# Patient Record
Sex: MALE | Race: WHITE | NOT HISPANIC OR LATINO | Employment: UNEMPLOYED | URBAN - METROPOLITAN AREA
[De-identification: names, ages, dates, MRNs, and addresses within clinical notes are randomized per-mention and may not be internally consistent; named-entity substitution may affect disease eponyms.]

---

## 2021-10-29 ENCOUNTER — OFFICE VISIT (OUTPATIENT)
Dept: FAMILY MEDICINE CLINIC | Facility: CLINIC | Age: 41
End: 2021-10-29
Payer: COMMERCIAL

## 2021-10-29 VITALS
HEIGHT: 71 IN | OXYGEN SATURATION: 98 % | WEIGHT: 171.4 LBS | DIASTOLIC BLOOD PRESSURE: 78 MMHG | HEART RATE: 67 BPM | TEMPERATURE: 97.3 F | SYSTOLIC BLOOD PRESSURE: 118 MMHG | BODY MASS INDEX: 24 KG/M2

## 2021-10-29 DIAGNOSIS — Z11.59 NEED FOR HEPATITIS C SCREENING TEST: ICD-10-CM

## 2021-10-29 DIAGNOSIS — F10.10 ALCOHOL USE DISORDER, MILD, ABUSE: ICD-10-CM

## 2021-10-29 DIAGNOSIS — Z00.00 HEALTH MAINTENANCE EXAMINATION: Primary | ICD-10-CM

## 2021-10-29 DIAGNOSIS — A63.0 CONDYLOMA ACUMINATA: ICD-10-CM

## 2021-10-29 DIAGNOSIS — Z23 NEEDS FLU SHOT: ICD-10-CM

## 2021-10-29 DIAGNOSIS — F90.0 ATTENTION DEFICIT HYPERACTIVITY DISORDER (ADHD), PREDOMINANTLY INATTENTIVE TYPE: ICD-10-CM

## 2021-10-29 DIAGNOSIS — Z13.220 NEED FOR LIPID SCREENING: ICD-10-CM

## 2021-10-29 DIAGNOSIS — Z11.4 ENCOUNTER FOR SCREENING FOR HIV: ICD-10-CM

## 2021-10-29 PROCEDURE — 90682 RIV4 VACC RECOMBINANT DNA IM: CPT

## 2021-10-29 PROCEDURE — 90471 IMMUNIZATION ADMIN: CPT

## 2021-10-29 PROCEDURE — 99386 PREV VISIT NEW AGE 40-64: CPT | Performed by: FAMILY MEDICINE

## 2021-10-29 RX ORDER — IMIQUIMOD 12.5 MG/.25G
1 CREAM TOPICAL 3 TIMES WEEKLY
Qty: 24 EACH | Refills: 2 | Status: SHIPPED | OUTPATIENT
Start: 2021-10-29

## 2021-10-29 RX ORDER — DEXTROAMPHETAMINE SACCHARATE, AMPHETAMINE ASPARTATE, DEXTROAMPHETAMINE SULFATE AND AMPHETAMINE SULFATE 2.5; 2.5; 2.5; 2.5 MG/1; MG/1; MG/1; MG/1
10 TABLET ORAL DAILY
Qty: 30 TABLET | Refills: 0 | Status: SHIPPED | OUTPATIENT
Start: 2021-10-29 | End: 2021-11-29 | Stop reason: SDUPTHER

## 2021-11-23 ENCOUNTER — LAB (OUTPATIENT)
Dept: LAB | Facility: CLINIC | Age: 41
End: 2021-11-23
Payer: COMMERCIAL

## 2021-11-23 DIAGNOSIS — Z13.220 NEED FOR LIPID SCREENING: ICD-10-CM

## 2021-11-23 DIAGNOSIS — Z11.59 NEED FOR HEPATITIS C SCREENING TEST: ICD-10-CM

## 2021-11-23 DIAGNOSIS — Z11.4 ENCOUNTER FOR SCREENING FOR HIV: ICD-10-CM

## 2021-11-23 LAB
CHOLEST SERPL-MCNC: 198 MG/DL
HCV AB SER QL: NORMAL
HDLC SERPL-MCNC: 89 MG/DL
LDLC SERPL CALC-MCNC: 98 MG/DL (ref 0–100)
NONHDLC SERPL-MCNC: 109 MG/DL
TRIGL SERPL-MCNC: 57 MG/DL

## 2021-11-23 PROCEDURE — 86803 HEPATITIS C AB TEST: CPT

## 2021-11-23 PROCEDURE — 87389 HIV-1 AG W/HIV-1&-2 AB AG IA: CPT

## 2021-11-23 PROCEDURE — 36415 COLL VENOUS BLD VENIPUNCTURE: CPT

## 2021-11-23 PROCEDURE — 80061 LIPID PANEL: CPT

## 2021-11-24 LAB — HIV 1+2 AB+HIV1 P24 AG SERPL QL IA: NORMAL

## 2021-11-29 ENCOUNTER — OFFICE VISIT (OUTPATIENT)
Dept: FAMILY MEDICINE CLINIC | Facility: CLINIC | Age: 41
End: 2021-11-29
Payer: COMMERCIAL

## 2021-11-29 VITALS
DIASTOLIC BLOOD PRESSURE: 10 MMHG | SYSTOLIC BLOOD PRESSURE: 144 MMHG | TEMPERATURE: 97.4 F | WEIGHT: 169.8 LBS | HEIGHT: 71 IN | HEART RATE: 74 BPM | BODY MASS INDEX: 23.77 KG/M2 | OXYGEN SATURATION: 98 %

## 2021-11-29 DIAGNOSIS — F10.10 ALCOHOL USE DISORDER, MILD, ABUSE: ICD-10-CM

## 2021-11-29 DIAGNOSIS — F90.0 ATTENTION DEFICIT HYPERACTIVITY DISORDER (ADHD), PREDOMINANTLY INATTENTIVE TYPE: Primary | ICD-10-CM

## 2021-11-29 DIAGNOSIS — A63.0 CONDYLOMA ACUMINATA: ICD-10-CM

## 2021-11-29 PROCEDURE — 99214 OFFICE O/P EST MOD 30 MIN: CPT | Performed by: FAMILY MEDICINE

## 2021-11-29 PROCEDURE — 1036F TOBACCO NON-USER: CPT | Performed by: FAMILY MEDICINE

## 2021-11-29 PROCEDURE — 3008F BODY MASS INDEX DOCD: CPT | Performed by: FAMILY MEDICINE

## 2021-11-29 RX ORDER — DEXTROAMPHETAMINE SACCHARATE, AMPHETAMINE ASPARTATE, DEXTROAMPHETAMINE SULFATE AND AMPHETAMINE SULFATE 2.5; 2.5; 2.5; 2.5 MG/1; MG/1; MG/1; MG/1
10 TABLET ORAL DAILY
Qty: 30 TABLET | Refills: 0 | Status: SHIPPED | OUTPATIENT
Start: 2021-11-29 | End: 2021-12-23

## 2021-12-23 DIAGNOSIS — F90.0 ATTENTION DEFICIT HYPERACTIVITY DISORDER (ADHD), PREDOMINANTLY INATTENTIVE TYPE: ICD-10-CM

## 2021-12-23 RX ORDER — DEXTROAMPHETAMINE SACCHARATE, AMPHETAMINE ASPARTATE, DEXTROAMPHETAMINE SULFATE AND AMPHETAMINE SULFATE 2.5; 2.5; 2.5; 2.5 MG/1; MG/1; MG/1; MG/1
TABLET ORAL
Qty: 30 TABLET | Refills: 0 | Status: SHIPPED | OUTPATIENT
Start: 2021-12-23 | End: 2021-12-27

## 2021-12-24 DIAGNOSIS — F90.0 ATTENTION DEFICIT HYPERACTIVITY DISORDER (ADHD), PREDOMINANTLY INATTENTIVE TYPE: ICD-10-CM

## 2021-12-27 RX ORDER — DEXTROAMPHETAMINE SACCHARATE, AMPHETAMINE ASPARTATE, DEXTROAMPHETAMINE SULFATE AND AMPHETAMINE SULFATE 2.5; 2.5; 2.5; 2.5 MG/1; MG/1; MG/1; MG/1
TABLET ORAL
Qty: 30 TABLET | Refills: 0 | Status: SHIPPED | OUTPATIENT
Start: 2021-12-27 | End: 2022-02-01

## 2022-02-01 DIAGNOSIS — F90.0 ATTENTION DEFICIT HYPERACTIVITY DISORDER (ADHD), PREDOMINANTLY INATTENTIVE TYPE: ICD-10-CM

## 2022-02-01 RX ORDER — DEXTROAMPHETAMINE SACCHARATE, AMPHETAMINE ASPARTATE, DEXTROAMPHETAMINE SULFATE AND AMPHETAMINE SULFATE 2.5; 2.5; 2.5; 2.5 MG/1; MG/1; MG/1; MG/1
TABLET ORAL
Qty: 30 TABLET | Refills: 0 | Status: SHIPPED | OUTPATIENT
Start: 2022-02-01 | End: 2022-03-07 | Stop reason: SDUPTHER

## 2022-03-07 ENCOUNTER — OFFICE VISIT (OUTPATIENT)
Dept: FAMILY MEDICINE CLINIC | Facility: CLINIC | Age: 42
End: 2022-03-07
Payer: COMMERCIAL

## 2022-03-07 VITALS
HEART RATE: 82 BPM | DIASTOLIC BLOOD PRESSURE: 88 MMHG | TEMPERATURE: 97.3 F | BODY MASS INDEX: 23.77 KG/M2 | SYSTOLIC BLOOD PRESSURE: 143 MMHG | WEIGHT: 169.8 LBS | HEIGHT: 71 IN | OXYGEN SATURATION: 99 %

## 2022-03-07 DIAGNOSIS — F90.0 ATTENTION DEFICIT HYPERACTIVITY DISORDER (ADHD), PREDOMINANTLY INATTENTIVE TYPE: Primary | ICD-10-CM

## 2022-03-07 DIAGNOSIS — Z23 ENCOUNTER FOR IMMUNIZATION: ICD-10-CM

## 2022-03-07 DIAGNOSIS — F10.10 ALCOHOL USE DISORDER, MILD, ABUSE: ICD-10-CM

## 2022-03-07 DIAGNOSIS — A63.0 CONDYLOMA ACUMINATA: ICD-10-CM

## 2022-03-07 PROCEDURE — 99214 OFFICE O/P EST MOD 30 MIN: CPT | Performed by: FAMILY MEDICINE

## 2022-03-07 PROCEDURE — 1036F TOBACCO NON-USER: CPT | Performed by: FAMILY MEDICINE

## 2022-03-07 PROCEDURE — 90471 IMMUNIZATION ADMIN: CPT

## 2022-03-07 PROCEDURE — 90715 TDAP VACCINE 7 YRS/> IM: CPT

## 2022-03-07 PROCEDURE — 3008F BODY MASS INDEX DOCD: CPT | Performed by: FAMILY MEDICINE

## 2022-03-07 RX ORDER — DEXTROAMPHETAMINE SACCHARATE, AMPHETAMINE ASPARTATE, DEXTROAMPHETAMINE SULFATE AND AMPHETAMINE SULFATE 2.5; 2.5; 2.5; 2.5 MG/1; MG/1; MG/1; MG/1
1 TABLET ORAL DAILY
Qty: 30 TABLET | Refills: 0 | Status: SHIPPED | OUTPATIENT
Start: 2022-03-07 | End: 2022-04-06 | Stop reason: SDUPTHER
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
## 2022-03-07 NOTE — PATIENT INSTRUCTIONS
Doing well with Adderall  Will continue to provide monthly refills although he goes away for an extended period of time, will try to write for a 90 day supply  Alcohol seems to be under controlled  Treatment of condyloma is up to him  Tetanus with whooping cough vaccine    Recheck next October for follow-up an annual physical

## 2022-04-06 DIAGNOSIS — F90.0 ATTENTION DEFICIT HYPERACTIVITY DISORDER (ADHD), PREDOMINANTLY INATTENTIVE TYPE: ICD-10-CM

## 2022-04-07 RX ORDER — DEXTROAMPHETAMINE SACCHARATE, AMPHETAMINE ASPARTATE, DEXTROAMPHETAMINE SULFATE AND AMPHETAMINE SULFATE 2.5; 2.5; 2.5; 2.5 MG/1; MG/1; MG/1; MG/1
1 TABLET ORAL DAILY
Qty: 30 TABLET | Refills: 0 | Status: SHIPPED | OUTPATIENT
Start: 2022-04-07 | End: 2022-05-02 | Stop reason: SDUPTHER

## 2022-05-02 DIAGNOSIS — F90.0 ATTENTION DEFICIT HYPERACTIVITY DISORDER (ADHD), PREDOMINANTLY INATTENTIVE TYPE: ICD-10-CM

## 2022-05-02 RX ORDER — DEXTROAMPHETAMINE SACCHARATE, AMPHETAMINE ASPARTATE, DEXTROAMPHETAMINE SULFATE AND AMPHETAMINE SULFATE 2.5; 2.5; 2.5; 2.5 MG/1; MG/1; MG/1; MG/1
1 TABLET ORAL DAILY
Qty: 30 TABLET | Refills: 0 | Status: SHIPPED | OUTPATIENT
Start: 2022-05-02 | End: 2022-05-31 | Stop reason: SDUPTHER

## 2022-05-31 DIAGNOSIS — F90.0 ATTENTION DEFICIT HYPERACTIVITY DISORDER (ADHD), PREDOMINANTLY INATTENTIVE TYPE: ICD-10-CM

## 2022-05-31 RX ORDER — DEXTROAMPHETAMINE SACCHARATE, AMPHETAMINE ASPARTATE, DEXTROAMPHETAMINE SULFATE AND AMPHETAMINE SULFATE 2.5; 2.5; 2.5; 2.5 MG/1; MG/1; MG/1; MG/1
1 TABLET ORAL DAILY
Qty: 30 TABLET | Refills: 0 | Status: SHIPPED | OUTPATIENT
Start: 2022-05-31 | End: 2022-06-29 | Stop reason: SDUPTHER

## 2022-06-29 DIAGNOSIS — F90.0 ATTENTION DEFICIT HYPERACTIVITY DISORDER (ADHD), PREDOMINANTLY INATTENTIVE TYPE: ICD-10-CM

## 2022-06-30 RX ORDER — DEXTROAMPHETAMINE SACCHARATE, AMPHETAMINE ASPARTATE, DEXTROAMPHETAMINE SULFATE AND AMPHETAMINE SULFATE 2.5; 2.5; 2.5; 2.5 MG/1; MG/1; MG/1; MG/1
1 TABLET ORAL DAILY
Qty: 30 TABLET | Refills: 0 | Status: SHIPPED | OUTPATIENT
Start: 2022-06-30 | End: 2022-07-29 | Stop reason: SDUPTHER

## 2022-07-29 DIAGNOSIS — F90.0 ATTENTION DEFICIT HYPERACTIVITY DISORDER (ADHD), PREDOMINANTLY INATTENTIVE TYPE: ICD-10-CM

## 2022-07-29 RX ORDER — DEXTROAMPHETAMINE SACCHARATE, AMPHETAMINE ASPARTATE, DEXTROAMPHETAMINE SULFATE AND AMPHETAMINE SULFATE 2.5; 2.5; 2.5; 2.5 MG/1; MG/1; MG/1; MG/1
1 TABLET ORAL DAILY
Qty: 30 TABLET | Refills: 0 | Status: SHIPPED | OUTPATIENT
Start: 2022-07-29 | End: 2022-08-28 | Stop reason: SDUPTHER

## 2022-08-28 DIAGNOSIS — F90.0 ATTENTION DEFICIT HYPERACTIVITY DISORDER (ADHD), PREDOMINANTLY INATTENTIVE TYPE: ICD-10-CM

## 2022-08-29 DIAGNOSIS — F90.0 ATTENTION DEFICIT HYPERACTIVITY DISORDER (ADHD), PREDOMINANTLY INATTENTIVE TYPE: ICD-10-CM

## 2022-08-29 RX ORDER — DEXTROAMPHETAMINE SACCHARATE, AMPHETAMINE ASPARTATE, DEXTROAMPHETAMINE SULFATE AND AMPHETAMINE SULFATE 2.5; 2.5; 2.5; 2.5 MG/1; MG/1; MG/1; MG/1
1 TABLET ORAL DAILY
Qty: 30 TABLET | Refills: 0 | Status: SHIPPED | OUTPATIENT
Start: 2022-08-29 | End: 2022-10-27 | Stop reason: SDUPTHER

## 2022-08-29 RX ORDER — DEXTROAMPHETAMINE SACCHARATE, AMPHETAMINE ASPARTATE, DEXTROAMPHETAMINE SULFATE AND AMPHETAMINE SULFATE 2.5; 2.5; 2.5; 2.5 MG/1; MG/1; MG/1; MG/1
1 TABLET ORAL DAILY
Qty: 30 TABLET | Refills: 0 | Status: SHIPPED | OUTPATIENT
Start: 2022-08-29 | End: 2022-08-29 | Stop reason: SDUPTHER

## 2022-10-27 DIAGNOSIS — F90.0 ATTENTION DEFICIT HYPERACTIVITY DISORDER (ADHD), PREDOMINANTLY INATTENTIVE TYPE: ICD-10-CM

## 2022-10-27 RX ORDER — DEXTROAMPHETAMINE SACCHARATE, AMPHETAMINE ASPARTATE, DEXTROAMPHETAMINE SULFATE AND AMPHETAMINE SULFATE 2.5; 2.5; 2.5; 2.5 MG/1; MG/1; MG/1; MG/1
1 TABLET ORAL DAILY
Qty: 30 TABLET | Refills: 0 | Status: SHIPPED | OUTPATIENT
Start: 2022-10-27

## 2022-10-30 ENCOUNTER — TELEPHONE (OUTPATIENT)
Dept: OTHER | Facility: OTHER | Age: 42
End: 2022-10-30

## 2022-12-10 DIAGNOSIS — F90.0 ATTENTION DEFICIT HYPERACTIVITY DISORDER (ADHD), PREDOMINANTLY INATTENTIVE TYPE: ICD-10-CM

## 2022-12-12 RX ORDER — DEXTROAMPHETAMINE SACCHARATE, AMPHETAMINE ASPARTATE, DEXTROAMPHETAMINE SULFATE AND AMPHETAMINE SULFATE 2.5; 2.5; 2.5; 2.5 MG/1; MG/1; MG/1; MG/1
1 TABLET ORAL DAILY
Qty: 30 TABLET | Refills: 0 | Status: SHIPPED | OUTPATIENT
Start: 2022-12-12

## 2023-01-10 DIAGNOSIS — F90.0 ATTENTION DEFICIT HYPERACTIVITY DISORDER (ADHD), PREDOMINANTLY INATTENTIVE TYPE: ICD-10-CM

## 2023-01-11 RX ORDER — DEXTROAMPHETAMINE SACCHARATE, AMPHETAMINE ASPARTATE, DEXTROAMPHETAMINE SULFATE AND AMPHETAMINE SULFATE 2.5; 2.5; 2.5; 2.5 MG/1; MG/1; MG/1; MG/1
1 TABLET ORAL DAILY
Qty: 30 TABLET | Refills: 0 | OUTPATIENT
Start: 2023-01-11

## 2023-03-04 ENCOUNTER — NURSE TRIAGE (OUTPATIENT)
Dept: OTHER | Facility: OTHER | Age: 43
End: 2023-03-04

## 2023-03-04 NOTE — TELEPHONE ENCOUNTER
Reason for Disposition  • Wood tick bite with no complications    Answer Assessment - Initial Assessment Questions  1  TYPE of TICK: "Is it a wood tick or a deer tick?" If unsure, ask: "What size was the tick?" "Did it look more like a watermelon seed or a poppy seed?"       Dog tick per pt  Bigger than a poppy seed    2  LOCATION: "Where is the tick bite located?"       Belly button    3  ONSET: "How long do you think the tick was attached before you removed it?" (Hours or days)       12 hours    4  SYMPTOMS      No symptoms  Protocols used: TICK BITE-ADULT-    Pt  Was bit by a tick last night  States he believes it was a dog tick  Was on his body for about 12 hours  No symptoms at this time  [place] and wondering if he should have prophylactic antibiotics  Explained that dog ticks typically do not carry Lyme Disease and risk is low due to short time of exposure  Encouraged pt  to call the office on Monday if still concerned or sooner with any symptoms  Pt  agreeable with disposition

## 2023-03-04 NOTE — TELEPHONE ENCOUNTER
Regarding: tick bite  ----- Message from Vonda Pugh sent at 3/4/2023  9:24 AM EST -----  "My  had a tick bite and it fed on him  We think it was on him for 12 to 24 hours  I would like him to be put on antibiotics  "

## 2024-04-04 ENCOUNTER — TELEPHONE (OUTPATIENT)
Age: 44
End: 2024-04-04

## 2024-04-04 NOTE — TELEPHONE ENCOUNTER
Appointment scheduled with provider.    Reason: Annual Physical    Symptoms: Physical    Provider: Dr. Maximiliano Ness    Date/Time: Monday 4/29/2024 at 3:40 pm

## 2024-09-16 ENCOUNTER — OFFICE VISIT (OUTPATIENT)
Dept: FAMILY MEDICINE CLINIC | Facility: CLINIC | Age: 44
End: 2024-09-16
Payer: COMMERCIAL

## 2024-09-16 VITALS
DIASTOLIC BLOOD PRESSURE: 82 MMHG | HEART RATE: 79 BPM | HEIGHT: 70 IN | BODY MASS INDEX: 21.93 KG/M2 | SYSTOLIC BLOOD PRESSURE: 134 MMHG | TEMPERATURE: 97.5 F | WEIGHT: 153.2 LBS | OXYGEN SATURATION: 98 %

## 2024-09-16 DIAGNOSIS — F90.0 ATTENTION DEFICIT HYPERACTIVITY DISORDER (ADHD), PREDOMINANTLY INATTENTIVE TYPE: ICD-10-CM

## 2024-09-16 DIAGNOSIS — N52.8 OTHER MALE ERECTILE DYSFUNCTION: ICD-10-CM

## 2024-09-16 DIAGNOSIS — Z00.00 HEALTH MAINTENANCE EXAMINATION: Primary | ICD-10-CM

## 2024-09-16 DIAGNOSIS — F10.91 ALCOHOL USE DISORDER IN REMISSION: ICD-10-CM

## 2024-09-16 DIAGNOSIS — Z12.11 SCREENING FOR COLON CANCER: ICD-10-CM

## 2024-09-16 PROCEDURE — 99396 PREV VISIT EST AGE 40-64: CPT | Performed by: FAMILY MEDICINE

## 2024-09-16 PROCEDURE — 99213 OFFICE O/P EST LOW 20 MIN: CPT | Performed by: FAMILY MEDICINE

## 2024-09-16 RX ORDER — SILDENAFIL 100 MG/1
100 TABLET, FILM COATED ORAL DAILY PRN
Qty: 30 TABLET | Refills: 5 | Status: SHIPPED | OUTPATIENT
Start: 2024-09-16

## 2024-09-16 RX ORDER — DEXTROAMPHETAMINE SACCHARATE, AMPHETAMINE ASPARTATE, DEXTROAMPHETAMINE SULFATE AND AMPHETAMINE SULFATE 2.5; 2.5; 2.5; 2.5 MG/1; MG/1; MG/1; MG/1
1 TABLET ORAL DAILY
Qty: 30 TABLET | Refills: 0 | Status: SHIPPED | OUTPATIENT
Start: 2024-09-16
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
## 2024-10-07 ENCOUNTER — OFFICE VISIT (OUTPATIENT)
Dept: FAMILY MEDICINE CLINIC | Facility: CLINIC | Age: 44
End: 2024-10-07
Payer: COMMERCIAL

## 2024-10-07 VITALS
WEIGHT: 156 LBS | HEIGHT: 70 IN | DIASTOLIC BLOOD PRESSURE: 80 MMHG | SYSTOLIC BLOOD PRESSURE: 144 MMHG | HEART RATE: 106 BPM | BODY MASS INDEX: 22.33 KG/M2 | OXYGEN SATURATION: 96 % | TEMPERATURE: 97.6 F

## 2024-10-07 DIAGNOSIS — F41.9 ANXIETY: Primary | ICD-10-CM

## 2024-10-07 PROCEDURE — 99214 OFFICE O/P EST MOD 30 MIN: CPT | Performed by: FAMILY MEDICINE

## 2024-10-07 RX ORDER — ESCITALOPRAM OXALATE 5 MG/1
5 TABLET ORAL DAILY
Qty: 30 TABLET | Refills: 2 | Status: SHIPPED | OUTPATIENT
Start: 2024-10-07

## 2024-10-07 NOTE — PROGRESS NOTES
"Ambulatory Visit  Name: Polo Nelson      : 1980      MRN: [id number]  Encounter Provider: Racquel Mcallister MD  Encounter Date: 10/7/2024   Encounter department: [facility]    Assessment & Plan  Anxiety  ALTAGRACIA-7 screening performed today with a score of 19 indicative of severe anxiety. Discussed treatment options including starting an SSRI/SNRI and patient is agreeable at this time. Start Lexapro 5mg daily; follow up in 4 weeks. He will continue weekly therapy sessions with his therapist.     Orders:    escitalopram (LEXAPRO) 5 mg tablet; Take 1 tablet (5 mg total) by mouth daily       History of Present Illness     Polo Nelson is a pleasant 43 year old male with a past medical history of ADHD and alcohol abuse who presents today accompanied by his wife Leyla to discuss anxiety. Patient states that he has been suffering from anxiety and panic attacks for the majority of his life but never knew what it was. He states that he started seeing a therapist a month ago which has been helping but thinks that his anxiety has been progressively worsening. He believes that is likely where his alcoholism stems from and admits that he has been using alcohol to self medicate.       Review of Systems   Constitutional: Negative.    HENT: Negative.     Eyes: Negative.    Respiratory: Negative.     Cardiovascular: Negative.    Gastrointestinal: Negative.    Musculoskeletal: Negative.    Skin: Negative.    Neurological: Negative.    Psychiatric/Behavioral:  The patient is nervous/anxious.            Objective     /80 (BP Location: Left arm, Patient Position: Sitting, Cuff Size: Adult)   Pulse (!) 106   Temp 97.6 °F (36.4 °C) (Temporal)   Ht 5' 10\" (1.778 m)   Wt 70.8 kg (156 lb)   SpO2 96%   BMI 22.38 kg/m²     Physical Exam  Constitutional:       General: He is not in acute distress.     Appearance: He is not ill-appearing.   HENT:      Head: Normocephalic and atraumatic.   Eyes:      General:       "   Right eye: No discharge.         Left eye: No discharge.      Extraocular Movements: Extraocular movements intact.   Pulmonary:      Effort: Pulmonary effort is normal. No respiratory distress.   Neurological:      General: No focal deficit present.      Mental Status: He is alert.   Psychiatric:         Mood and Affect: Mood is anxious.

## 2024-10-12 DIAGNOSIS — F90.0 ATTENTION DEFICIT HYPERACTIVITY DISORDER (ADHD), PREDOMINANTLY INATTENTIVE TYPE: ICD-10-CM

## 2024-10-14 RX ORDER — DEXTROAMPHETAMINE SACCHARATE, AMPHETAMINE ASPARTATE, DEXTROAMPHETAMINE SULFATE AND AMPHETAMINE SULFATE 2.5; 2.5; 2.5; 2.5 MG/1; MG/1; MG/1; MG/1
1 TABLET ORAL DAILY
Qty: 30 TABLET | Refills: 0 | Status: SHIPPED | OUTPATIENT
Start: 2024-10-14

## 2024-10-15 ENCOUNTER — TELEPHONE (OUTPATIENT)
Dept: OTHER | Facility: HOSPITAL | Age: 44
End: 2024-10-15

## 2024-10-15 NOTE — TELEPHONE ENCOUNTER
Patient called for a refill of   amphetamine-dextroamphetamine (ADDERALL) 10 mg tablet   Medication was sent to the correct pharmacy yesterday- patient to call pharmacy

## 2024-11-04 ENCOUNTER — TELEPHONE (OUTPATIENT)
Age: 44
End: 2024-11-04

## 2024-11-04 NOTE — TELEPHONE ENCOUNTER
Patient called requesting refill for escitalopram. Patient made aware medication was refilled on 10/07/24 for 30 with 2 refills to [facility] pharmacy. Patient instructed to contact the pharmacy to obtain refills of medication. Patient verbalized understanding.

## 2024-11-18 DIAGNOSIS — F90.0 ATTENTION DEFICIT HYPERACTIVITY DISORDER (ADHD), PREDOMINANTLY INATTENTIVE TYPE: ICD-10-CM

## 2024-11-19 RX ORDER — DEXTROAMPHETAMINE SACCHARATE, AMPHETAMINE ASPARTATE, DEXTROAMPHETAMINE SULFATE AND AMPHETAMINE SULFATE 2.5; 2.5; 2.5; 2.5 MG/1; MG/1; MG/1; MG/1
1 TABLET ORAL DAILY
Qty: 30 TABLET | Refills: 0 | Status: SHIPPED | OUTPATIENT
Start: 2024-11-19

## 2024-11-21 ENCOUNTER — OFFICE VISIT (OUTPATIENT)
Dept: FAMILY MEDICINE CLINIC | Facility: CLINIC | Age: 44
End: 2024-11-21
Payer: COMMERCIAL

## 2024-11-21 VITALS
TEMPERATURE: 97.4 F | HEART RATE: 72 BPM | SYSTOLIC BLOOD PRESSURE: 138 MMHG | OXYGEN SATURATION: 99 % | HEIGHT: 70 IN | BODY MASS INDEX: 23.79 KG/M2 | WEIGHT: 166.2 LBS | DIASTOLIC BLOOD PRESSURE: 100 MMHG

## 2024-11-21 DIAGNOSIS — F41.9 ANXIETY: ICD-10-CM

## 2024-11-21 PROCEDURE — 99213 OFFICE O/P EST LOW 20 MIN: CPT | Performed by: FAMILY MEDICINE

## 2024-11-21 RX ORDER — ESCITALOPRAM OXALATE 10 MG/1
10 TABLET ORAL DAILY
Qty: 90 TABLET | Refills: 0 | Status: SHIPPED | OUTPATIENT
Start: 2024-11-21

## 2024-12-13 ENCOUNTER — OFFICE VISIT (OUTPATIENT)
Dept: FAMILY MEDICINE CLINIC | Facility: CLINIC | Age: 44
End: 2024-12-13
Payer: COMMERCIAL

## 2024-12-13 ENCOUNTER — HOSPITAL ENCOUNTER (OUTPATIENT)
Facility: HOSPITAL | Age: 44
Setting detail: OBSERVATION
Discharge: HOME | End: 2024-12-14
Attending: INTERNAL MEDICINE | Admitting: INTERNAL MEDICINE
Payer: COMMERCIAL

## 2024-12-13 VITALS
HEIGHT: 70 IN | DIASTOLIC BLOOD PRESSURE: 90 MMHG | SYSTOLIC BLOOD PRESSURE: 136 MMHG | WEIGHT: 160.2 LBS | OXYGEN SATURATION: 97 % | HEART RATE: 88 BPM | BODY MASS INDEX: 22.94 KG/M2

## 2024-12-13 DIAGNOSIS — Z72.0 NICOTINE USE: ICD-10-CM

## 2024-12-13 DIAGNOSIS — F10.10 ALCOHOL ABUSE: Primary | ICD-10-CM

## 2024-12-13 DIAGNOSIS — N52.8 OTHER MALE ERECTILE DYSFUNCTION: ICD-10-CM

## 2024-12-13 DIAGNOSIS — F11.90 OPIOID USE: ICD-10-CM

## 2024-12-13 DIAGNOSIS — F41.9 ANXIETY: ICD-10-CM

## 2024-12-13 DIAGNOSIS — F10.20 ALCOHOL USE DISORDER, SEVERE, DEPENDENCE (HCC): Primary | ICD-10-CM

## 2024-12-13 DIAGNOSIS — G47.00 INSOMNIA: ICD-10-CM

## 2024-12-13 DIAGNOSIS — F90.0 ATTENTION DEFICIT HYPERACTIVITY DISORDER (ADHD), PREDOMINANTLY INATTENTIVE TYPE: ICD-10-CM

## 2024-12-13 DIAGNOSIS — F10.939 ALCOHOL WITHDRAWAL SYNDROME WITH COMPLICATION (HCC): ICD-10-CM

## 2024-12-13 PROBLEM — F11.93 OPIOID WITHDRAWAL (HCC): Status: ACTIVE | Noted: 2024-12-13

## 2024-12-13 LAB
ALBUMIN SERPL BCG-MCNC: 4.5 G/DL (ref 3.5–5)
ALP SERPL-CCNC: 54 U/L (ref 34–104)
ALT SERPL W P-5'-P-CCNC: 22 U/L (ref 7–52)
ANION GAP SERPL CALCULATED.3IONS-SCNC: 9 MMOL/L (ref 4–13)
AST SERPL W P-5'-P-CCNC: 38 U/L (ref 13–39)
ATRIAL RATE: 59 BPM
BASOPHILS # BLD AUTO: 0.02 THOUSANDS/ΜL (ref 0–0.1)
BASOPHILS NFR BLD AUTO: 1 % (ref 0–1)
BILIRUB SERPL-MCNC: 0.42 MG/DL (ref 0.2–1)
BUN SERPL-MCNC: 7 MG/DL (ref 5–25)
CALCIUM SERPL-MCNC: 8.7 MG/DL (ref 8.4–10.2)
CHLORIDE SERPL-SCNC: 95 MMOL/L (ref 96–108)
CO2 SERPL-SCNC: 29 MMOL/L (ref 21–32)
CREAT SERPL-MCNC: 0.59 MG/DL (ref 0.6–1.3)
EOSINOPHIL # BLD AUTO: 0.02 THOUSAND/ΜL (ref 0–0.61)
EOSINOPHIL NFR BLD AUTO: 1 % (ref 0–6)
ERYTHROCYTE [DISTWIDTH] IN BLOOD BY AUTOMATED COUNT: 11.6 % (ref 11.6–15.1)
ETHANOL SERPL-MCNC: <10 MG/DL
GFR SERPL CREATININE-BSD FRML MDRD: 121 ML/MIN/1.73SQ M
GLUCOSE SERPL-MCNC: 114 MG/DL (ref 65–140)
HCT VFR BLD AUTO: 40.1 % (ref 36.5–49.3)
HGB BLD-MCNC: 14.5 G/DL (ref 12–17)
IMM GRANULOCYTES # BLD AUTO: 0.02 THOUSAND/UL (ref 0–0.2)
IMM GRANULOCYTES NFR BLD AUTO: 1 % (ref 0–2)
LYMPHOCYTES # BLD AUTO: 0.58 THOUSANDS/ΜL (ref 0.6–4.47)
LYMPHOCYTES NFR BLD AUTO: 13 % (ref 14–44)
MAGNESIUM SERPL-MCNC: 2 MG/DL (ref 1.9–2.7)
MCH RBC QN AUTO: 31.5 PG (ref 26.8–34.3)
MCHC RBC AUTO-ENTMCNC: 36.2 G/DL (ref 31.4–37.4)
MCV RBC AUTO: 87 FL (ref 82–98)
MONOCYTES # BLD AUTO: 0.37 THOUSAND/ΜL (ref 0.17–1.22)
MONOCYTES NFR BLD AUTO: 8 % (ref 4–12)
NEUTROPHILS # BLD AUTO: 3.4 THOUSANDS/ΜL (ref 1.85–7.62)
NEUTS SEG NFR BLD AUTO: 76 % (ref 43–75)
NRBC BLD AUTO-RTO: 0 /100 WBCS
P AXIS: 39 DEGREES
PLATELET # BLD AUTO: 201 THOUSANDS/UL (ref 149–390)
PMV BLD AUTO: 10.2 FL (ref 8.9–12.7)
POTASSIUM SERPL-SCNC: 3.7 MMOL/L (ref 3.5–5.3)
PR INTERVAL: 144 MS
PROT SERPL-MCNC: 6.8 G/DL (ref 6.4–8.4)
QRS AXIS: -2 DEGREES
QRSD INTERVAL: 96 MS
QT INTERVAL: 454 MS
QTC INTERVAL: 450 MS
RBC # BLD AUTO: 4.6 MILLION/UL (ref 3.88–5.62)
SODIUM SERPL-SCNC: 133 MMOL/L (ref 135–147)
T WAVE AXIS: 0 DEGREES
VENTRICULAR RATE: 59 BPM
WBC # BLD AUTO: 4.41 THOUSAND/UL (ref 4.31–10.16)

## 2024-12-13 PROCEDURE — 85025 COMPLETE CBC W/AUTO DIFF WBC: CPT

## 2024-12-13 PROCEDURE — 93010 ELECTROCARDIOGRAM REPORT: CPT | Performed by: INTERNAL MEDICINE

## 2024-12-13 PROCEDURE — 99215 OFFICE O/P EST HI 40 MIN: CPT | Performed by: FAMILY MEDICINE

## 2024-12-13 PROCEDURE — 99223 1ST HOSP IP/OBS HIGH 75: CPT | Performed by: INTERNAL MEDICINE

## 2024-12-13 PROCEDURE — 93005 ELECTROCARDIOGRAM TRACING: CPT

## 2024-12-13 PROCEDURE — 80053 COMPREHEN METABOLIC PANEL: CPT

## 2024-12-13 PROCEDURE — 83735 ASSAY OF MAGNESIUM: CPT

## 2024-12-13 PROCEDURE — 82077 ASSAY SPEC XCP UR&BREATH IA: CPT

## 2024-12-13 PROCEDURE — 99223 1ST HOSP IP/OBS HIGH 75: CPT | Performed by: EMERGENCY MEDICINE

## 2024-12-13 RX ORDER — TRAZODONE HYDROCHLORIDE 50 MG/1
50 TABLET, FILM COATED ORAL
Status: DISCONTINUED | OUTPATIENT
Start: 2024-12-13 | End: 2024-12-14 | Stop reason: HOSPADM

## 2024-12-13 RX ORDER — ONDANSETRON 2 MG/ML
4 INJECTION INTRAMUSCULAR; INTRAVENOUS EVERY 6 HOURS PRN
Status: DISCONTINUED | OUTPATIENT
Start: 2024-12-13 | End: 2024-12-14 | Stop reason: HOSPADM

## 2024-12-13 RX ORDER — PHENOBARBITAL SODIUM 130 MG/ML
130 INJECTION, SOLUTION INTRAMUSCULAR; INTRAVENOUS ONCE
Status: COMPLETED | OUTPATIENT
Start: 2024-12-13 | End: 2024-12-13

## 2024-12-13 RX ORDER — LANOLIN ALCOHOL/MO/W.PET/CERES
100 CREAM (GRAM) TOPICAL DAILY
Status: DISCONTINUED | OUTPATIENT
Start: 2024-12-13 | End: 2024-12-14 | Stop reason: HOSPADM

## 2024-12-13 RX ORDER — DIAZEPAM 10 MG/2ML
10 INJECTION, SOLUTION INTRAMUSCULAR; INTRAVENOUS ONCE
Status: DISCONTINUED | OUTPATIENT
Start: 2024-12-13 | End: 2024-12-14 | Stop reason: HOSPADM

## 2024-12-13 RX ORDER — SODIUM CHLORIDE 9 MG/ML
100 INJECTION, SOLUTION INTRAVENOUS CONTINUOUS
Status: DISCONTINUED | OUTPATIENT
Start: 2024-12-13 | End: 2024-12-14

## 2024-12-13 RX ORDER — ACETAMINOPHEN 325 MG/1
650 TABLET ORAL EVERY 6 HOURS PRN
Status: DISCONTINUED | OUTPATIENT
Start: 2024-12-13 | End: 2024-12-14 | Stop reason: HOSPADM

## 2024-12-13 RX ORDER — FOLIC ACID 1 MG/1
1 TABLET ORAL DAILY
Status: DISCONTINUED | OUTPATIENT
Start: 2024-12-13 | End: 2024-12-14 | Stop reason: HOSPADM

## 2024-12-13 RX ORDER — GABAPENTIN 300 MG/1
300 CAPSULE ORAL EVERY 8 HOURS PRN
Status: DISCONTINUED | OUTPATIENT
Start: 2024-12-13 | End: 2024-12-14 | Stop reason: HOSPADM

## 2024-12-13 RX ORDER — ENOXAPARIN SODIUM 100 MG/ML
40 INJECTION SUBCUTANEOUS DAILY
Status: DISCONTINUED | OUTPATIENT
Start: 2024-12-13 | End: 2024-12-14 | Stop reason: HOSPADM

## 2024-12-13 RX ADMIN — PHENOBARBITAL SODIUM 130 MG: 130 INJECTION INTRAMUSCULAR; INTRAVENOUS at 19:58

## 2024-12-13 RX ADMIN — PHENOBARBITAL SODIUM 130 MG: 130 INJECTION INTRAMUSCULAR; INTRAVENOUS at 23:30

## 2024-12-13 RX ADMIN — GABAPENTIN 300 MG: 300 CAPSULE ORAL at 19:59

## 2024-12-13 RX ADMIN — SODIUM CHLORIDE 100 ML/HR: 0.9 INJECTION, SOLUTION INTRAVENOUS at 13:42

## 2024-12-13 RX ADMIN — FOLIC ACID 1 MG: 1 TABLET ORAL at 13:41

## 2024-12-13 RX ADMIN — Medication 650 MG: at 13:47

## 2024-12-13 RX ADMIN — MULTIPLE VITAMINS W/ MINERALS TAB 1 TABLET: TAB ORAL at 13:41

## 2024-12-13 RX ADMIN — THIAMINE HCL TAB 100 MG 100 MG: 100 TAB at 13:41

## 2024-12-13 NOTE — PROGRESS NOTES
Name: Polo Nelson      : 1980      MRN: [id number]  Encounter Provider: Racquel Mcallister MD  Encounter Date: 2024   Encounter department: [facility]  :  Assessment & Plan  Alcohol abuse  - Discussed treatment options at length; patient [facility]. Spoke with PACS and SLIM provider at [facility] who will accept patient as a direct admission. Patient's wife will drive him to [facility]. Discussed referral to addiction services at [facility] through Dr Edward upon discharge from the detox center and patient is also agreeable.     Orders:    Transfer to other facility    Ambulatory referral to Addiction Services; Future    Anxiety    Orders:    Ambulatory referral to Addiction Services; Future             HPI    Polo Nelson is a very pleasant 44 year old male with a past medical history of alcohol abuse, anxiety and ADHD who presents today accompanied by his wife Leyla to discuss his alcoholism. Patient reports that he has a long standing history of alcohol abuse which started 10 years ago. Both he and his wife reports that he goes through periods where he drinks heavily for a few days and then stops cold turkey. He reports that he has been drinking for 10 days straight and reports that he has been drinking 30 servings a day in the form of a 15 pack of 8% beer a day. He states that two days ago he tried to stop cold turkey but started to develop tremors, visual hallucinations and insomnia. Today he started drinking early this morning in order to stop the withdrawal symptoms and has had three 8% beers since 6.30am. He wants to quit drinking but is scared to stop on his own and is seeking inpatient treatment. His wife reports that his behavior has also been extremely erratic over the past 10 days as well.      Review of Systems   Constitutional: Negative.    HENT: Negative.     Eyes: Negative.    Respiratory: Negative.    "  Cardiovascular: Negative.    Gastrointestinal: Negative.    Musculoskeletal: Negative.    Skin: Negative.    Neurological:  Positive for tremors.   Psychiatric/Behavioral:  Positive for agitation, behavioral problems, hallucinations and sleep disturbance. The patient is nervous/anxious.        Objective   /90 (BP Location: Left arm, Patient Position: Sitting, Cuff Size: Adult)   Pulse 88   Ht 5' 10\" (1.778 m)   Wt 72.7 kg (160 lb 3.2 oz)   SpO2 97%   BMI 22.99 kg/m²      Physical Exam  Constitutional:       General: He is not in acute distress.     Appearance: He is not ill-appearing.   HENT:      Head: Normocephalic and atraumatic.   Eyes:      General:         Right eye: No discharge.         Left eye: No discharge.      Extraocular Movements: Extraocular movements intact.   Cardiovascular:      Rate and Rhythm: Normal rate.   Pulmonary:      Effort: Pulmonary effort is normal.   Neurological:      General: No focal deficit present.      Mental Status: He is alert.   Psychiatric:         Mood and Affect: Mood is anxious. Affect is tearful.         Speech: Speech normal.       I have spent a total time of 60 minutes in caring for this patient on the day of the visit/encounter including Risks and benefits of tx options, Patient and family education, Counseling / Coordination of care, and Communicating with other healthcare professionals .   "

## 2024-12-13 NOTE — ASSESSMENT & PLAN NOTE
Patient endorsing severe anxiety in the setting of alcohol withdrawal  Currently on Lexapro 10 mg daily   Will continue   Can consider gabapentin or atrax prn   Consider psychiatry consult

## 2024-12-13 NOTE — ASSESSMENT & PLAN NOTE
Can use gabapentin 300mg TID PRN for anxiety  Consider psychiatry evaluation for anxiety and ADHD.

## 2024-12-13 NOTE — ASSESSMENT & PLAN NOTE
Continue daily vitamin supplementation with thiamine, folic acid, and multivitamin  Appreciate certified  and case management consultations for recommendations regarding aftercare resources, recovery support and disposition planning  Patient is interested in outpatient rehabilitation.  He is contemplating naltrexone.  Is not a candidate right now given his chronic daily kratom use and potential for precipitating opioid withdrawal.  Last kratom use was 2 AM 12/13.  If he is interested in this, would initiate 25 mg on day 1 followed by 50 mg daily.  If he elects to pursue naltrexone, would wait 7 days (12/20) to initiate naltrexone

## 2024-12-13 NOTE — CERTIFIED RECOVERY SPECIALIST
Certified  Note    Patient name: Polo Nelson  Location: 5T DETOX 507/5T DETOX 50*  [place]: [facility]  Attending:  Sukhjinder Perez DO MRN [id number]  : 1980  Age: 44 y.o.    Sex: male Date 2024         Substance Use History:     Social History     Substance and Sexual Activity   Alcohol Use Yes    Comment: Beer Social        Social History     Substance and Sexual Activity   Drug Use Never     Time spent with Patient 25    CRS engaged with patient to check in and offer support is desired.  CRS made introduction and  explained CRS services provided at hospital.     Patient presented as pleasant an open to discussion about his AUD. Patient has been able to stop drinking on his own prior but unable to stop at this time.      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 Patient also has history of kratom use.    Patient reports that he is willing to go to AA meetings and attend OP treatment at this time. Naltrexone may be an option for assistance with alcohol cessation.     CRS team will continue to follow.     Resources and contact information given       Staci White

## 2024-12-13 NOTE — ASSESSMENT & PLAN NOTE
Patient with a history of chronic daily alcohol use  Last drink was this morning around 6 AM  Serum alcohol pending  [facility] of [facility] withdrawal  Current alcohol withdrawal signs/symptoms include anxiety, tremor, diaphoresis  SEWS score 13 upon admission  Administer 650 mg of phenobarbital as initial dose based on SEWS  Continue monitoring under protocol and administer phenobarbital as indicated with a maximum of 2g  Continuous pulse ox and telemetry monitoring  Agree with checking labs as he was directly admitted from the outpatient setting

## 2024-12-13 NOTE — ASSESSMENT & PLAN NOTE
H/o chronic daily alcohol consumption, denies h/o withdrawal seizures  Last drink: 12/13/24   Ethanol lvl: pending   Toxicology consulted; appreciate recommendations   Obtain admission blood work   Initiate on SEWS protocol   Endorsing withdrawal symptoms of sweats, severe anxiety, and mild tremor   Telemetry and pulse oximetry monitoring

## 2024-12-13 NOTE — ASSESSMENT & PLAN NOTE
Would monitor for signs of this in the setting of Kratom use  Currently has no objective findings on exam nor symptoms of withdrawal   Would use medications below PRN for supportive treatment of opioid withdrawal if he develops this    Adjunctive medications administered as needed:  Clonidine 0.1 mg PO Q6 hours PRN anxiety or palpitations    Gabapentin 300mg PO Q8 hours PRN anxiety    Diazepam 5 mg p.o. or IV q8 PRN refractory anxiety  Ibuprofen 600 mg PO Q6 hours PRN pain    Acetaminophen 1000mg PO Q8 hours PRN pain    Ondansetron 4 mg PO Q6 hours PRN N/V    Reglan 5-10 mg IV q8 p.r.n. refractory nausea vomiting  Nicotine patch 7, 14, 21 mg  PRN nicotine withdrawal   Trazodone 50 mg PO QHS PRN sleep    Loperamide 4 mg PO PRN diarrhea up to 16 mg/day

## 2024-12-13 NOTE — H&P
"H&P - Hospitalist   Name: Polo Nelson 44 y.o. male I MRN: [id number]  Unit/Bed#: 5T [facility] 507-01 I Date of Admission: 12/13/2024   Date of Service: 12/13/2024 I Hospital Day: 0     Assessment & Plan  Alcohol withdrawal syndrome with complication (HCC)  H/o chronic daily alcohol consumption, denies h/o withdrawal seizures  Last drink: 12/13/24   Ethanol lvl: pending   Toxicology consulted; appreciate recommendations   Obtain admission blood work   Initiate on SEWS protocol   Endorsing withdrawal symptoms of sweats, severe anxiety, and mild tremor   Telemetry and pulse oximetry monitoring       Anxiety  Patient endorsing severe anxiety in the setting of alcohol withdrawal  Currently on Lexapro 10 mg daily   Will continue   Can consider gabapentin or atrax prn   Consider psychiatry consult   Alcohol use disorder, severe, dependence (HCC)  Drinks 15 pack of beer daily x 10 days, reports over the last 2 days has been attempting to go \"cold turkey\". Endorsing severe withdrawal symptoms. States that he started drinking again this AM in order to stop his symptoms.   Denies H/o withdrawal seizures  Was evaluated by his primary care physician due to his withdrawal symptoms and was referred to the medical detox unit.   Contemplating naltrexone at this time  He is also using kratom. Discussed with patient that naltrexone can be an option in the future but he would also have to abstain from any kratom use for 7 to 10 days before starting on naltrexone   Withdrawal management as above  Initiate IVFs, thiamine, folic acid, and MVI  Consult case management/CRS for assistance with aftercare resources - pt interested in outpatient resources at this time   Opioid withdrawal (HCC)  Patient reports that he has been utilizing kratom 3 tsp daily 3-6 times daily   Last use of kratom 12/13/24 8 am   Toxicology consulted  Did discuss naltrexone vs. Suboxone with patient- will defer to toxicology       VTE Pharmacologic Prophylaxis:   Low " Risk (Score 0-2) - Encourage Ambulation.  Code Status: Level 1 - Full Code   Discussion with family: Updated  () at bedside.    Anticipated Length of Stay: Patient will be admitted on an observation basis with an anticipated length of stay of less than 2 midnights secondary to alcohol withdrawal .    History of Present Illness   Chief Complaint: alcohol withdrawal     Polo Nelson is a 44 y.o. male with a PMH of anxiety, ADHD, alcohol use disorder who presents from PCP office for acute withdrawal. Patient reports that he has been drinking heavily x 10 days, has struggled with sobriety. He has stopped drinking on his own at home however over the last two days he started to experience worsening withdrawal symptoms of severe anxiety, tremors, and insomnia. Patient also endorses that he has been utilizing kratom to help with his anxiety. Explained that he can also have underlining opioid withdrawal.   Patient continues to endorse the above withdrawal symptoms. After stabilization from a detox perspective patient is interested in outpatient resources.     Review of Systems   Constitutional:  Positive for diaphoresis.   Respiratory:  Negative for chest tightness and shortness of breath.    Cardiovascular:  Negative for chest pain.   Gastrointestinal:  Negative for abdominal pain, nausea and vomiting.   Musculoskeletal: Negative.    Skin: Negative.    Neurological:  Negative for tremors.   Psychiatric/Behavioral:  The patient is nervous/anxious.        Historical Information   Past Medical History:   Diagnosis Date    Alcohol use disorder, mild, abuse 10/29/2021    Attention deficit hyperactivity disorder (ADHD), predominantly inattentive type 10/29/2021    Condyloma acuminata 12/16/2014     No past surgical history on file.  Social History     Tobacco Use    Smoking status: Never    Smokeless tobacco: Never   Vaping Use    Vaping status: Never Used   Substance and Sexual Activity    Alcohol use: Yes      Comment: Beer Social    Drug use: Never    Sexual activity: Yes     Partners: Female     Comment:      E-Cigarette/Vaping    E-Cigarette Use Never User      E-Cigarette/Vaping Substances    Nicotine No     THC No     CBD Yes     Flavoring No      Family history non-contributory  Social History:  Marital Status: /Civil Union   Patient Pre-hospital Living Situation: Home  Patient Pre-hospital Level of Mobility: walks  Patient Pre-hospital Diet Restrictions: none     Meds/Allergies   I have reviewed home medications with patient personally.  Prior to Admission medications    Medication Sig Start Date End Date Taking? Authorizing Provider   amphetamine-dextroamphetamine (ADDERALL) 10 mg tablet Take 1 tablet (10 mg total) by mouth daily 11/19/24  Yes Maximiliano Ness MD   escitalopram (LEXAPRO) 10 mg tablet Take 1 tablet (10 mg total) by mouth daily 11/21/24  Yes Racquel Mcallister MD   imiquimod (ALDARA) 5 % cream Apply 1 packet topically 3 (three) times a week  Patient not taking: Reported on 9/16/2024 10/29/21   Maximiliano Ness MD   sildenafil (VIAGRA) 100 mg tablet Take 1 tablet (100 mg total) by mouth daily as needed for erectile dysfunction 9/16/24   Maximiliano Ness MD     No Known Allergies    Objective :  Temp:  [97.5 °F (36.4 °C)-98.3 °F (36.8 °C)] 97.5 °F (36.4 °C)  HR:  [65-88] 65  BP: (136-169)/(90-99) 155/95  Resp:  [18-20] 18  SpO2:  [97 %-100 %] 100 %  O2 Device: None (Room air)    Physical Exam  Constitutional:       Appearance: He is diaphoretic.   Cardiovascular:      Rate and Rhythm: Normal rate and regular rhythm.      Heart sounds: No murmur heard.  Pulmonary:      Effort: No respiratory distress.      Breath sounds: Normal breath sounds.   Abdominal:      General: Bowel sounds are normal. There is no distension.      Palpations: Abdomen is soft.      Tenderness: There is no abdominal tenderness.   Neurological:      Mental Status: He is alert and oriented to person, place, and time.  "  Psychiatric:         Mood and Affect: Mood is anxious. Mood is not depressed.          Lines/Drains:            Lab Results: I have reviewed the following results:                  No results found for: \"HGBA1C\"        Imaging Results Review: No pertinent imaging studies reviewed.  Other Study Results Review: EKG was reviewed.     Administrative Statements       ** Please Note: This note has been constructed using a voice recognition system. **    "

## 2024-12-13 NOTE — ASSESSMENT & PLAN NOTE
Patient reports that he has been utilizing kratom 3 tsp daily 3-6 times daily   Last use of kratom 12/13/24 8 am   Toxicology consulted  Did discuss naltrexone vs. Suboxone with patient- will defer to toxicology

## 2024-12-13 NOTE — CONSULTS
Consultation - Medical Toxicology   Name: Polo Nelson 44 y.o. male I MRN: [id number]  Unit/Bed#: 5T [facility] 507-01 I Date of Admission: 12/13/2024   Date of Service: 12/13/2024 I Hospital Day: 0   Inpatient consult to Toxicology  Consult performed by: Feliberto White DO  Consult ordered by: Joselyn Sauer PA-C        Physician Requesting Evaluation: Sukhjinder Perez DO   Reason for Evaluation / Principal Problem: AUD/SHANTI    Assessment & Plan  Alcohol withdrawal syndrome with complication (HCC)  Patient with a history of chronic daily alcohol use  Last drink was this morning around 6 AM  Serum alcohol pending  Initiate SEWS protocol for medical management of alcohol withdrawal  Current alcohol withdrawal signs/symptoms include anxiety, tremor, diaphoresis  SEWS score 13 upon admission  Administer 650 mg of phenobarbital as initial dose based on SEWS  Continue monitoring under protocol and administer phenobarbital as indicated with a maximum of 2g  Continuous pulse ox and telemetry monitoring  Agree with checking labs as he was directly admitted from the outpatient setting  Alcohol use disorder, severe, dependence (HCC)    Continue daily vitamin supplementation with thiamine, folic acid, and multivitamin  Appreciate certified  and case management consultations for recommendations regarding aftercare resources, recovery support and disposition planning  Patient is interested in outpatient rehabilitation.  He is contemplating naltrexone.  Is not a candidate right now given his chronic daily kratom use and potential for precipitating opioid withdrawal.  Last kratom use was 2 AM 12/13.  If he is interested in this, would initiate 25 mg on day 1 followed by 50 mg daily.  If he elects to pursue naltrexone, would wait 7 days (12/20) to initiate naltrexone    Opioid use  Patient uses daily kratom -self treating for anxiety/ADHD  He is interested in stopping kratom  Opioid withdrawal  ([facility])  Would monitor for signs of this in the setting of Kratom use  Currently has no objective findings on exam nor symptoms of withdrawal   Would use medications below PRN for supportive treatment of opioid withdrawal if he develops this    Adjunctive medications administered as needed:  Clonidine 0.1 mg PO Q6 hours PRN anxiety or palpitations    Gabapentin 300mg PO Q8 hours PRN anxiety    Diazepam 5 mg p.o. or IV q8 PRN refractory anxiety  Ibuprofen 600 mg PO Q6 hours PRN pain    Acetaminophen 1000mg PO Q8 hours PRN pain    Ondansetron 4 mg PO Q6 hours PRN N/V    Reglan 5-10 mg IV q8 p.r.n. refractory nausea vomiting  Nicotine patch 7, 14, 21 mg  PRN nicotine withdrawal   Trazodone 50 mg PO QHS PRN sleep    Loperamide 4 mg PO PRN diarrhea up to 16 mg/day    Anxiety  Can use gabapentin 300mg TID PRN for anxiety  Consider psychiatry evaluation for anxiety and ADHD.  Nicotine use  Encouraged cessation  Nicotine replacement as needed.  Patient has no symptoms of nicotine withdrawal  I have discussed the above management plan in detail with the primary service.   Medical Toxicology service will follow.        For further questions, please contact the medical  on call via SecureChat between 8am and 9pm. If between 9pm and 8am, please reach out to the [facility] at [phone].     History of Present Illness   Polo Nelson is a 44 y.o. year old male who presents from his PCP office for alcohol detoxification. He has been drinking alcohol for 10 years. There are periods that he has stopped 'cold turkey'. Tried to stop 2 days ago. Felt shaky and experienced insomnia and hallucinations. His last drink was at 0600 today.  States that he typically drinks 15 8% beverages throughout the day.  He does this in small doses so that nobody will notice his intoxication.  He drinks in cycles that can vary in length.  This past cycle of alcohol use includes daily use for the past 3 weeks.  Length will vary.  Symptoms  of withdrawal include sweaty, anxiety, shakiness.  Does not have any hallucinations now.  He also felt like his whole body was jumping when he went to fall asleep.  Also described scary dreams.    Has been self treating anxiety and ADHD with Kratom - takes 6-8 double teaspoons per day.  Has been doing this for months.  Last use of kratom was at 2 AM today.  He is interested in stopping.  Does also admit to tobacco use.  Is interested in outpatient rehab.        Review of Systems   Constitutional:  Positive for diaphoresis. Negative for chills.   HENT:  Negative for congestion, rhinorrhea and sore throat.    Eyes:  Negative for visual disturbance.   Respiratory:  Negative for cough and shortness of breath.    Cardiovascular:  Negative for chest pain.   Gastrointestinal:  Negative for abdominal pain, diarrhea, nausea and vomiting.   Genitourinary:  Negative for difficulty urinating.   Musculoskeletal:  Negative for arthralgias.   Neurological:  Positive for tremors. Negative for headaches.   Psychiatric/Behavioral:  Positive for sleep disturbance. Negative for hallucinations (resolved). The patient is nervous/anxious.        Historical Information   I have reviewed the patient's PMH, PSH, Social History, Family History, Meds, and Allergies  Social History     Tobacco Use    Smoking status: Never    Smokeless tobacco: Never   Vaping Use    Vaping status: Never Used   Substance and Sexual Activity    Alcohol use: Yes     Comment: Beer Social    Drug use: Never    Sexual activity: Yes     Partners: Female     Comment:      Family History   Problem Relation Age of Onset    Heart disease Family     Cancer Family     No Known Problems Mother     Celiac disease Father        Meds/Allergies   Prior to Admission medications    Medication Sig Start Date End Date Taking? Authorizing Provider   amphetamine-dextroamphetamine (ADDERALL) 10 mg tablet Take 1 tablet (10 mg total) by mouth daily 11/19/24  Yes Maximiliano Ness MD    escitalopram (LEXAPRO) 10 mg tablet Take 1 tablet (10 mg total) by mouth daily 11/21/24  Yes Racquel Mcallister MD   imiquimod (ALDARA) 5 % cream Apply 1 packet topically 3 (three) times a week  Patient not taking: Reported on 9/16/2024 10/29/21   Maximiliano Ness MD   sildenafil (VIAGRA) 100 mg tablet Take 1 tablet (100 mg total) by mouth daily as needed for erectile dysfunction 9/16/24   Maximiliano Ness MD     Current Facility-Administered Medications:     acetaminophen (TYLENOL) tablet 650 mg, 650 mg, Oral, Q6H PRN, Joselyn Sauer PA-C    diazepam (VALIUM) injection 10 mg, 10 mg, Intravenous, Once, Feliberto White DO    enoxaparin (LOVENOX) subcutaneous injection 40 mg, 40 mg, Subcutaneous, Daily, Joselyn Sauer PA-C    folic acid (FOLVITE) tablet 1 mg, 1 mg, Oral, Daily, Joselyn Sauer PA-C, 1 mg at 12/13/24 1341    gabapentin (NEURONTIN) capsule 300 mg, 300 mg, Oral, Q8H PRN, Joselyn Sauer PA-C    multivitamin-minerals (CENTRUM) tablet 1 tablet, 1 tablet, Oral, Daily, Joselyn Sauer PA-C, 1 tablet at 12/13/24 1341    ondansetron (ZOFRAN) injection 4 mg, 4 mg, Intravenous, Q6H PRN, Joselyn Sauer PA-C    phenobarbital in sodium chloride 0.9% 650 mg, 650 mg, Intravenous, Once, Feliberto White DO, 650 mg at 12/13/24 1347    sodium chloride 0.9 % infusion, 100 mL/hr, Intravenous, Continuous, Joselyn Sauer PA-C, Last Rate: 100 mL/hr at 12/13/24 1342, 100 mL/hr at 12/13/24 1342    thiamine tablet 100 mg, 100 mg, Oral, Daily, Joselyn Sauer PA-C, 100 mg at 12/13/24 1341    traZODone (DESYREL) tablet 50 mg, 50 mg, Oral, HS PRN, Joselyn Sauer PA-C   No Known Allergies    Objective :  Temp:  [97.5 °F (36.4 °C)-98.3 °F (36.8 °C)] 97.5 °F (36.4 °C)  HR:  [65-88] 65  BP: (136-169)/(90-99) 155/95  Resp:  [18-20] 18  SpO2:  [97 %-100 %] 100 %  O2 Device: None (Room air)    No intake or output data in the 24 hours ending 12/13/24  1350    Physical Exam  Vitals and nursing note reviewed.   Constitutional:       General: He is not in acute distress.     Appearance: He is not toxic-appearing or diaphoretic.   HENT:      Head: Normocephalic and atraumatic.      Comments: No tongue fasciculations     Right Ear: External ear normal.      Left Ear: External ear normal.      Nose: Nose normal.   Eyes:      Pupils: Pupils are equal, round, and reactive to light.   Cardiovascular:      Rate and Rhythm: Normal rate.      Pulses: Normal pulses.      Heart sounds: Normal heart sounds.   Pulmonary:      Effort: Pulmonary effort is normal.      Breath sounds: Normal breath sounds.   Abdominal:      General: Abdomen is flat.      Palpations: Abdomen is soft.      Tenderness: There is no abdominal tenderness.   Musculoskeletal:         General: Normal range of motion.      Cervical back: Normal range of motion and neck supple.      Right lower leg: No edema.      Left lower leg: No edema.   Skin:     General: Skin is warm and dry.      Capillary Refill: Capillary refill takes less than 2 seconds.      Comments: No piloerection   Neurological:      General: No focal deficit present.      Mental Status: He is alert.      GCS: GCS eye subscore is 4. GCS verbal subscore is 5. GCS motor subscore is 6.      Cranial Nerves: No dysarthria or facial asymmetry.      Sensory: No sensory deficit.      Motor: Tremor present. No weakness.      Coordination: Finger-Nose-Finger Test normal.      Gait: Gait is intact. Gait normal.   Psychiatric:         Mood and Affect: Mood is anxious.           Lab Results: I have reviewed the following results:  Results from last 7 days   Lab Units 12/13/24  1303   WBC Thousand/uL 4.41   HEMOGLOBIN g/dL 14.5   HEMATOCRIT % 40.1   PLATELETS Thousands/uL 201   SEGS PCT % 76*   LYMPHO PCT % 13*   MONO PCT % 8   EOS PCT % 1                              Imaging Results Review: No pertinent imaging studies reviewed.  Other Study Results Review:  No additional pertinent studies reviewed.    Administrative Statements   I have spent a total time of 40 minutes in caring for this patient on the day of the visit/encounter including Diagnostic results, Prognosis, Risks and benefits of tx options, Instructions for management, Patient and family education, Importance of tx compliance, Risk factor reductions, Impressions, Counseling / Coordination of care, Documenting in the medical record, Reviewing / ordering tests, medicine, procedures  , Obtaining or reviewing history  , and Communicating with other healthcare professionals .

## 2024-12-13 NOTE — ASSESSMENT & PLAN NOTE
Encouraged cessation  Nicotine replacement as needed.  Patient has no symptoms of nicotine withdrawal

## 2024-12-13 NOTE — ASSESSMENT & PLAN NOTE
"Drinks 15 pack of beer daily x 10 days, reports over the last 2 days has been attempting to go \"cold turkey\". Endorsing severe withdrawal symptoms. States that he started drinking again this AM in order to stop his symptoms.   Denies H/o withdrawal seizures  Was evaluated by his primary care physician due to his withdrawal symptoms and was referred to the medical detox unit.   Contemplating naltrexone at this time  He is also using kratom. Discussed with patient that naltrexone can be an option in the future but he would also have to abstain from any kratom use for 7 to 10 days before starting on naltrexone   Withdrawal management as above  Initiate IVFs, thiamine, folic acid, and MVI  Consult case management/CRS for assistance with aftercare resources - pt interested in outpatient resources at this time   "

## 2024-12-14 VITALS
SYSTOLIC BLOOD PRESSURE: 156 MMHG | OXYGEN SATURATION: 100 % | TEMPERATURE: 98.3 F | DIASTOLIC BLOOD PRESSURE: 90 MMHG | BODY MASS INDEX: 22.9 KG/M2 | RESPIRATION RATE: 18 BRPM | HEIGHT: 70 IN | HEART RATE: 68 BPM | WEIGHT: 160 LBS

## 2024-12-14 LAB
ALBUMIN SERPL BCG-MCNC: 3.7 G/DL (ref 3.5–5)
ALP SERPL-CCNC: 45 U/L (ref 34–104)
ALT SERPL W P-5'-P-CCNC: 18 U/L (ref 7–52)
ANION GAP SERPL CALCULATED.3IONS-SCNC: 6 MMOL/L (ref 4–13)
AST SERPL W P-5'-P-CCNC: 29 U/L (ref 13–39)
BILIRUB SERPL-MCNC: 0.62 MG/DL (ref 0.2–1)
BUN SERPL-MCNC: 6 MG/DL (ref 5–25)
CALCIUM SERPL-MCNC: 8.2 MG/DL (ref 8.4–10.2)
CHLORIDE SERPL-SCNC: 101 MMOL/L (ref 96–108)
CO2 SERPL-SCNC: 28 MMOL/L (ref 21–32)
CREAT SERPL-MCNC: 0.58 MG/DL (ref 0.6–1.3)
ERYTHROCYTE [DISTWIDTH] IN BLOOD BY AUTOMATED COUNT: 11.7 % (ref 11.6–15.1)
GFR SERPL CREATININE-BSD FRML MDRD: 122 ML/MIN/1.73SQ M
GLUCOSE SERPL-MCNC: 94 MG/DL (ref 65–140)
HCT VFR BLD AUTO: 40.6 % (ref 36.5–49.3)
HGB BLD-MCNC: 14.3 G/DL (ref 12–17)
MAGNESIUM SERPL-MCNC: 2 MG/DL (ref 1.9–2.7)
MCH RBC QN AUTO: 30.7 PG (ref 26.8–34.3)
MCHC RBC AUTO-ENTMCNC: 35.2 G/DL (ref 31.4–37.4)
MCV RBC AUTO: 87 FL (ref 82–98)
PLATELET # BLD AUTO: 161 THOUSANDS/UL (ref 149–390)
PMV BLD AUTO: 10.5 FL (ref 8.9–12.7)
POTASSIUM SERPL-SCNC: 3.7 MMOL/L (ref 3.5–5.3)
PROT SERPL-MCNC: 5.8 G/DL (ref 6.4–8.4)
RBC # BLD AUTO: 4.66 MILLION/UL (ref 3.88–5.62)
SODIUM SERPL-SCNC: 135 MMOL/L (ref 135–147)
WBC # BLD AUTO: 6.73 THOUSAND/UL (ref 4.31–10.16)

## 2024-12-14 PROCEDURE — 85027 COMPLETE CBC AUTOMATED: CPT

## 2024-12-14 PROCEDURE — 99239 HOSP IP/OBS DSCHRG MGMT >30: CPT

## 2024-12-14 PROCEDURE — 83735 ASSAY OF MAGNESIUM: CPT

## 2024-12-14 PROCEDURE — 80053 COMPREHEN METABOLIC PANEL: CPT

## 2024-12-14 PROCEDURE — G0379 DIRECT REFER HOSPITAL OBSERV: HCPCS

## 2024-12-14 PROCEDURE — NC001 PR NO CHARGE: Performed by: EMERGENCY MEDICINE

## 2024-12-14 RX ORDER — NALTREXONE HYDROCHLORIDE 50 MG/1
50 TABLET, FILM COATED ORAL DAILY
Qty: 30 TABLET | Refills: 0 | OUTPATIENT
Start: 2024-12-24

## 2024-12-14 RX ORDER — HYDROXYZINE HYDROCHLORIDE 25 MG/1
25 TABLET, FILM COATED ORAL EVERY 6 HOURS PRN
Qty: 56 TABLET | Refills: 0 | Status: SHIPPED | OUTPATIENT
Start: 2024-12-14 | End: 2024-12-28

## 2024-12-14 RX ORDER — PHENOBARBITAL SODIUM 130 MG/ML
130 INJECTION, SOLUTION INTRAMUSCULAR; INTRAVENOUS ONCE
Status: COMPLETED | OUTPATIENT
Start: 2024-12-14 | End: 2024-12-14

## 2024-12-14 RX ORDER — TRAZODONE HYDROCHLORIDE 50 MG/1
50 TABLET, FILM COATED ORAL
Qty: 30 TABLET | Refills: 0 | Status: SHIPPED | OUTPATIENT
Start: 2024-12-14 | End: 2025-01-13

## 2024-12-14 RX ADMIN — FOLIC ACID 1 MG: 1 TABLET ORAL at 08:31

## 2024-12-14 RX ADMIN — THIAMINE HCL TAB 100 MG 100 MG: 100 TAB at 08:31

## 2024-12-14 RX ADMIN — MULTIPLE VITAMINS W/ MINERALS TAB 1 TABLET: TAB ORAL at 08:31

## 2024-12-14 RX ADMIN — SODIUM CHLORIDE 100 ML/HR: 0.9 INJECTION, SOLUTION INTRAVENOUS at 00:25

## 2024-12-14 RX ADMIN — PHENOBARBITAL SODIUM 130 MG: 130 INJECTION INTRAMUSCULAR; INTRAVENOUS at 02:24

## 2024-12-14 NOTE — ASSESSMENT & PLAN NOTE
Continue daily thiamine, folate, and multivitamin supplementation.    Patient is interested in naltrexone; counseled extensively on risks/benefits/alternatives and side effects. Recommend naltrexone 50 mg daily to be prescribed upon discharge with instructions to start 14 days after last opioid use.    Appreciate CRS consult for recovery support.    Appreciate case management consult for disposition planning; patient desires outpatient follow up.

## 2024-12-14 NOTE — DISCHARGE SUMMARY
"Discharge Summary - Hospitalist   Name: Polo Nelson 44 y.o. male I MRN: [id number]  Unit/Bed#: 5T [facility] 507-01 I Date of Admission: 12/13/2024   Date of Service: 12/14/2024 I Hospital Day: 0     Assessment & Plan  Alcohol withdrawal syndrome with complication (HCC)  H/o chronic daily alcohol consumption, denies h/o withdrawal seizures  Last drink: 12/13/24   Ethanol lvl: < 20   Toxicology consulted: Patient received a total of 1,170 mg of phenobarbital. Discussed with toxicology- SEWS protocol can be discontinued. Patient is stable from a withdrawal perspective for discharge.   Anxiety  Patient endorsing severe anxiety in the setting of alcohol withdrawal  Currently on Lexapro 10 mg daily   Will continue   Alcohol use disorder, severe, dependence (HCC)  Drinks 15 pack of beer daily x 10 days, reports over the last 2 days has been attempting to go \"cold turkey\". Endorsing severe withdrawal symptoms. States that he started drinking again this AM in order to stop his symptoms.   Denies H/o withdrawal seizures  Was evaluated by his primary care physician due to his withdrawal symptoms and was referred to the medical detox unit.   Interested in Naltrexone- will send prescription to pharmacy.   He is also using kratom. Patient advised to not begin medication until 12/24/24   Withdrawal management as above  Consult case management/CRS for assistance with aftercare resources - pt interested in outpatient resources at this time   Opioid withdrawal (HCC)  Patient reports that he has been utilizing kratom 3 tsp daily 3-6 times daily   Last use of kratom 12/13/24 8 am   Toxicology consulted    Attention deficit hyperactivity disorder (ADHD), predominantly inattentive type  Continue home medication       Medical Problems       Resolved Problems  Date Reviewed: 9/16/2024   None       Discharging Physician / Practitioner: Joselyn Sauer PA-C  PCP: Maximiliano Ness MD  Admission Date:   Admission Orders (From admission, " "onward)       Ordered        12/13/24 1248  Place in Observation  Once                          Discharge Date: 12/14/24    Consultations During Hospital Stay:  Toxicology     Procedures Performed:   None     Significant Findings / Test Results:   None     Incidental Findings:   None        Test Results Pending at Discharge (will require follow up):   None      Outpatient Tests Requested:  None     Complications:  none     Reason for Admission: alcohol withdrawal     Hospital Course:   Polo Nelson is a 44 y.o. male patient who originally presented to the hospital on 12/13/2024 due to alcohol withdrawal. Patient initially presented from his primary care office for admission to the medical detox unit, as PCP was concern for severe withdrawal. Patient was admitted to the [facility] medical detox unit under [facility] protocol for medically assisted alcohol withdrawal and received a total of 1,107 mg phenobarbital without complication, with last dose of phenobarbital administered 12/14/24 0204. Patient's alcohol withdrawal symptoms subsequently resolved, and he has remained without objective evidence of alcohol withdrawal at this time. Patient also endorsed using kratom. Toxicology recommended naltrexone upon discharge.It is recommended that Naltrexone begins 10 days after discharge to avoid symptoms of precipitated withdrawal. Case management and CRS were consulted for assistance with aftercare resources, and patient will be discharged with outpatient resources.       Please see above list of diagnoses and related plan for additional information.     Condition at Discharge: stable    Discharge Day Visit / Exam:   Subjective:  Denies further withdrawal symptoms. Tolerating PO diet.   Vitals: Blood Pressure: 137/82 (12/14/24 0713)  Pulse: 79 (12/14/24 0713)  Temperature: 98.5 °F (36.9 °C) (12/14/24 0713)  Temp Source: Temporal (12/14/24 0713)  Respirations: 18 (12/14/24 0713)  Height: 5' 10\" (177.8 cm) (12/13/24 1300)  Weight - " Scale: 72.6 kg (160 lb) (12/13/24 1300)  SpO2: 99 % (12/14/24 0713)  Physical Exam  Vitals reviewed.   Constitutional:       General: He is not in acute distress.     Appearance: He is not diaphoretic.   Eyes:      General: No scleral icterus.     Pupils: Pupils are equal, round, and reactive to light.   Cardiovascular:      Rate and Rhythm: Normal rate and regular rhythm.      Heart sounds: No murmur heard.  Pulmonary:      Effort: No respiratory distress.      Breath sounds: Normal breath sounds.   Abdominal:      General: There is no distension.      Palpations: Abdomen is soft.   Neurological:      Mental Status: He is alert and oriented to person, place, and time.      Motor: No tremor.          Discussion with Family: Patient declined call to .     Discharge instructions/Information to patient and family:   See after visit summary for information provided to patient and family.      Provisions for Follow-Up Care:  See after visit summary for information related to follow-up care and any pertinent home health orders.      Mobility at time of Discharge:   Basic Mobility Inpatient Raw Score: 24  JH-HLM Goal: 8: Walk 250 feet or more  JH-HLM Achieved: 8: Walk 250 feet ot more  HLM Goal achieved. Continue to encourage appropriate mobility.     Disposition:   Home    Planned Readmission: none     Discharge Medications:  See after visit summary for reconciled discharge medications provided to patient and/or family.      Administrative Statements   Discharge Statement:  I have spent a total time of 32 minutes in caring for this patient on the day of the visit/encounter. .    **Please Note: This note may have been constructed using a voice recognition system**

## 2024-12-14 NOTE — CASE MANAGEMENT
Pt to discharge today. Pt denies any withdrawal symptoms at this time. Pt to discharge to home with family and father will  upon discharge. Pt to follow up with primary care office on Monday if they do not call pt first. Pt to follow up with [facility] office referral on Monday if they do not call him first. Pt provided with 12 step/AA meeting resources as time of discharge as well.      12/14/24 3162   Discharge Planning   Living Arrangements Lives w/ Spouse/significant other;Lives w/ Children   Support Systems Friend;Spouse/significant other;Parent   Assistance Needed None   Type of Current Residence Private residence   Current Home Care Services No   DME Referral Provided   DME Needed: N/A   DME Company Name: N/A   Other Referral/Resources/Interventions Provided:   Referrals Provided: None indicated;Therapist  (Referred back to primary care for medication management and outpatient counseling with [facility] office)   Discharge Communications   Discharge planning discussed with: Patient   Freedom of Choice Yes   CM contacted family/caregiver? No- see comments  (Pt was texting with father regarding ride home, and spoke with wife on the phone, will CM completed assessment. CM offered to call and speak with pt's wife to discuss discharge plans and pt declined)   Were Treatment Team discharge recommendations reviewed with patient/caregiver? Yes   Did patient/caregiver verbalize understanding of patient care needs? Yes   Were patient/caregiver advised of the risks associated with not following Treatment Team discharge recommendations? Yes   [facility]   Would you like to participate in our [facility] service program?   No - Declined       Discharge Address:    [address] [place] [address]     Phone Number: [phone]

## 2024-12-14 NOTE — ASSESSMENT & PLAN NOTE
Patient endorses daily mitragynine use but wants to stop taking it    Encouraged cessation; plan for naltrexone for alcohol use disorder when opioid-free for 14 days.

## 2024-12-14 NOTE — ASSESSMENT & PLAN NOTE
Patient reports that he has been utilizing kratom 3 tsp daily 3-6 times daily   Last use of kratom 12/13/24 8 am   Toxicology consulted

## 2024-12-14 NOTE — ASSESSMENT & PLAN NOTE
Patient endorsing severe anxiety in the setting of alcohol withdrawal  Currently on Lexapro 10 mg daily   Will continue

## 2024-12-14 NOTE — PROGRESS NOTES
Pt's name, date of birth, home address and telephone number were verified. Pt was informed of case management role and the purpose of the completion of intake with case management.  Pt signed full ROIs for his wife/emergency contact, [facility], his primary care office, and [facility] office for outpatient counseling.        12/14/24 4698   Substance Abuse Addendum Details   History of Withdrawal Symptoms Other withdrawal symptoms (specify in comment)  (Denies any current withdrawal symptoms, some anxiety. Denies any history of DTs and hallucinations, denies black outs. Denies hx of overdose.)   Medical Complications Denies   Sober Supports Leyla (wife), parents, good friend (Baldev)   Present Treatment Current telehealth therapy with Brett   Substance Abuse Treatment Hx   (Attended AA meetings in the past, no prior history working with a sponsor)   Stage of Change   Stage of Change Contemplation           SUBSTANCE ABUSE    Stressor/Trigger    Pt reports he felt like he couldn't manage his drinking anymore, has a cycle of periods of sobriety and periods of drinking, had attempted to detox on his own and could not do it this weekend   UDS:   Audit:   PAWSS:  Nicotine: vaping  Ethanol:   Prior D&A treatment   Denies any prior treatment   AA/NA Meetings   Prior history of AA meeting, did nto work with a sponsor   Withdrawal  Symptoms   Anxiety   History of OD/blackouts or Withdrawal Seizures   Denies blackouts or D T s denies history of seizure or OD   MENTAL HEALTH INFORMATION    Hx of Mental Health Dx   Denies   Outpatient Mental Health Tx   Current outpatient telehealth therapy with a CBT therapst, Clt prescribed adderall 10mg, lexapro 10mg by his primary care   Inpatient Hospitalizations (Mental Health)   Denies   Family History of Mental Health    Grandmother may have been bipolar and struggled with alcoholism   Trauma History    Denies   Current MH Symptoms   Anxiety   Access to  Firearms    Denies   PHYSICAL HEALTH INFORMATION    Physical Health Conditions (Chronic)   Denies   PCP   Maximiliano Ness and Dr Racquel Cruz, [facility]   Insurance   [facility]   Preferred Pharmacy   [facility]   LEGAL ISSUES    Past or present legal issues   Denies   Probation/[place]   Denies   EMPLOYMENT/INCOME/NEEDS    Education   11th grade    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      History   Denies   Spiritual/Denominational Beliefs   Denies   Transportation/Transport Home   Father picking him up from hospital   DEMOGRAPHICS    Discharge Address   [address]   [place] [id number]    Living Arrangement   With family   Can pt return home   Yes   External Supports     Mother, parents, friend Baldev   Phone Number   [phone]    Email      Marital Status/Children   , one child age 10     Substance First use Last Use Frequency Amount Used How long Longest period of sobriety and when Method of use   THC   denies         Heroin   denies         Cocaine   denies         ETOH   15 or 16 Friday morning at 6am daily 15 pack of 8% beers for the past 3-4 beers Had tried to stop drinking on his own for about a day prior to admission Fall of 2023-summer 2024, about 8-9 months, was not doing anything formal to support his sobriety oral   Meth   denies         Benzos   denies         Other:  kratom    Friday morning at 2am Daily for the past year 12 teaspons Daily for the past year          CM specifically ask patient about his substance use around his daugther/school schedule as 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     CM provided pt with information regarding the different levels of care for RAJ treatment, when CM met with pt to complete initial intake pt was already cleared for discharge and was returning home so was not interested in residential  treatment. Pt will to engage with outpatient RAJ specific treatment. Pt already engaged with OP telehealth counseling with a counselor who provided CBT specifically to address clt's anxiety and ADHD. Pt did not know who this counselor worked for, or what agency his counseling was through, but thought the counselor worked out of [facility]. Pt also working with his PCP for medication.     CM unable to schedule appointments for follow up, as it is a Saturday, but CM will refer pt back to PCP for medication management/review, and then to the [facility] office. Pt is aware of both of this recommendations and is comfortable with this.

## 2024-12-14 NOTE — ASSESSMENT & PLAN NOTE
"Drinks 15 pack of beer daily x 10 days, reports over the last 2 days has been attempting to go \"cold turkey\". Endorsing severe withdrawal symptoms. States that he started drinking again this AM in order to stop his symptoms.   Denies H/o withdrawal seizures  Was evaluated by his primary care physician due to his withdrawal symptoms and was referred to the medical detox unit.   Interested in Naltrexone- will send prescription to pharmacy.   He is also using kratom. Patient advised to not begin medication until 12/24/24   Withdrawal management as above  Consult case management/CRS for assistance with aftercare resources - pt interested in outpatient resources at this time   "

## 2024-12-14 NOTE — DISCHARGE INSTR - AVS FIRST PAGE
Dear Polo Nelson,     It was our pleasure to care for you here at [facility].  It is our hope that we were always able to meet and exceed the expected standards for your care during your stay.  You were hospitalized due to alcohol withdrawal .  You were cared for on the 5th floor under the service of Joselyn Sauer PA-C with the [facility] who covers for your primary care physician (PCP), Maximiliano Ness MD, while you were hospitalized.  If you have any questions or concerns related to this hospitalization, you may contact us at .  For follow up, we recommend that you follow up with your primary care physician.  Please review this entire discharge summary as additional general instructions may be provided later as well.  However, at this time we provide for you here, the most important instructions / recommendations at discharge:     Please start Naltrexone 12/14/24       Sincerely,     Joselyn Sauer PA-C

## 2024-12-14 NOTE — PROGRESS NOTES
12/14/24 1420   Physical Activity   On average, how many days per week do you engage in moderate to strenuous exercise (like a brisk walk)? 5 days   On average, how many minutes do you engage in exercise at this level? 60 min   Financial Resource Strain   How hard is it for you to pay for the very basics like food, housing, medical care, and heating? Not hard   Transportation Needs   In the past 12 months, has lack of transportation kept you from medical appointments or from getting medications? no   In the past 12 months, has lack of transportation kept you from meetings, work, or from getting things needed for daily living? No   Food Insecurity   Within the past 12 months, you worried that your food would run out before you got the money to buy more. Never true   Within the past 12 months, the food you bought just didn't last and you didn't have money to get more. Never true   Stress   Do you feel stress - tense, restless, nervous, or anxious, or unable to sleep at night because your mind is troubled all the time - these days? Only a littl   Social Connections   In a typical week, how many times do you talk on the phone with family, friends, or neighbors? More than 3   How often do you get together with friends or relatives? More than 3   How often do you attend Baptist or Presybeterian services? Never   Do you belong to any clubs or organizations such as Baptist groups, unions, fraternal or athletic groups, or school groups? No   How often do you attend meetings of the clubs or organizations you belong to? Never   Are you , , , , never , or living with a partner?    Intimate Partner Violence   Within the last year, have you been afraid of your partner or ex-partner? No   Within the last year, have you been humiliated or emotionally abused in other ways by your partner or ex-partner? No   Within the last year, have you been kicked, hit, slapped, or otherwise physically  hurt by your partner or ex-partner? No   Within the last year, have you been raped or forced to have any kind of sexual activity by your partner or ex-partner? No   Alcohol Use   Q1: How often do you have a drink containing alcohol? 4 or more ti   Q2: How many drinks containing alcohol do you have on a typical day when you are drinking? 10 or more   Q3: How often do you have six or more drinks on one occasion? Weekly   Utilities   In the past 12 months has the electric, gas, oil, or water company threatened to shut off services in your home? No   Health Literacy   How often do you need to have someone help you when you read instructions, pamphlets, or other written material from your doctor or pharmacy? Never   Follow-Ups   We make community resources available to all of our patients to assist with everyday needs. We may be able to connect you with those resources. Would you be interested? N

## 2024-12-14 NOTE — ASSESSMENT & PLAN NOTE
H/o chronic daily alcohol consumption, denies h/o withdrawal seizures  Last drink: 12/13/24   Ethanol lvl: < 20   Toxicology consulted: Patient received a total of 1,170 mg of phenobarbital. Discussed with toxicology- SEWS protocol can be discontinued. Patient is stable from a withdrawal perspective for discharge.

## 2024-12-14 NOTE — PROGRESS NOTES
Progress Note - Medical Toxicology   Name: Polo Nelson 44 y.o. male I MRN: [id number]  Unit/Bed#: 5T DETOX 507-01 I Date of Admission: 12/13/2024   Date of Service: 12/14/2024 I Hospital Day: 0    Assessment & Plan  Alcohol withdrawal syndrome with complication (HCC)  No further withdrawal symptoms; discontinue SEWS protocol. Received 1040 mg total.  Alcohol use disorder, severe, dependence (HCC)  Continue daily thiamine, folate, and multivitamin supplementation.    Patient is interested in naltrexone; counseled extensively on risks/benefits/alternatives and side effects. Recommend naltrexone 50 mg daily to be prescribed upon discharge with instructions to start 14 days after last opioid use.    Appreciate CRS consult for recovery support.    Appreciate case management consult for disposition planning; patient desires outpatient follow up.  Opioid use  Patient endorses daily mitragynine use but wants to stop taking it    Encouraged cessation; plan for naltrexone for alcohol use disorder when opioid-free for 14 days.  Opioid withdrawal (HCC)  No current opioid withdrawal symptoms; can be treated supportively if they develop  Nicotine use  Encouraged cessation; patient declined nicotine replacement therapy    Patient is appropriate for disposition from a toxicology perspective. Medical toxicology will sign off. Thank you for the consult. Please contact the medical  on call with questions or concerns.    Reason for current consult: Alcohol withdrawal, alcohol use disorder     Subjective   Patient states he feels well today. Denies acute complaints. Tolerating PO intake and ambulating independently    Objective :  Temp:  [97.3 °F (36.3 °C)-98.5 °F (36.9 °C)] 98.3 °F (36.8 °C)  HR:  [55-79] 68  BP: (137-157)/(79-96) 156/90  Resp:  [16-18] 18  SpO2:  [96 %-100 %] 100 %  O2 Device: None (Room air)      Intake/Output Summary (Last 24 hours) at 12/14/2024 1234  Last data filed at 12/14/2024 0445  Gross per 24  hour   Intake 1290 ml   Output --   Net 1290 ml       Physical Exam  Vitals and nursing note reviewed.   Constitutional:       General: He is not in acute distress.  HENT:      Head: Normocephalic and atraumatic.      Nose: Nose normal.      Mouth/Throat:      Mouth: Mucous membranes are moist.   Eyes:      General: No scleral icterus.     Extraocular Movements: Extraocular movements intact.   Cardiovascular:      Rate and Rhythm: Normal rate and regular rhythm.   Pulmonary:      Effort: Pulmonary effort is normal. No respiratory distress.   Abdominal:      General: There is no distension.   Musculoskeletal:         General: No swelling or deformity.      Cervical back: Neck supple.   Skin:     General: Skin is warm and dry.   Neurological:      General: No focal deficit present.      Mental Status: He is alert and oriented to person, place, and time.      Comments: No tongue fasciculations or intention tremor   Psychiatric:         Mood and Affect: Mood normal.         Behavior: Behavior normal.           Lab Results: I have reviewed the following results:CBC/BMP:   .     12/14/24  0433   WBC 6.73   HGB 14.3   HCT 40.6      SODIUM 135   K 3.7      CO2 28   BUN 6   CREATININE 0.58*   GLUC 94   MG 2.0        Imaging Results Review: No pertinent imaging studies reviewed.  Other Study Results Review: No additional pertinent studies reviewed.    Administrative Statements   I have spent a total time of 10 minutes in caring for this patient on the day of the visit/encounter including Patient and family education, Importance of tx compliance, Risk factor reductions, Reviewing / ordering tests, medicine, procedures  , and Communicating with other healthcare professionals .

## 2024-12-14 NOTE — DISCHARGE INSTR - OTHER ORDERS
AA meeting guide   https://www.aa.org/find-aa    AA Phone apps:   Meeting Guide  Everything AA  In the Rooms    AA/24 hour hotline   [phone]

## 2024-12-16 DIAGNOSIS — F90.0 ATTENTION DEFICIT HYPERACTIVITY DISORDER (ADHD), PREDOMINANTLY INATTENTIVE TYPE: ICD-10-CM

## 2024-12-16 DIAGNOSIS — N52.8 OTHER MALE ERECTILE DYSFUNCTION: ICD-10-CM

## 2024-12-17 RX ORDER — SILDENAFIL 100 MG/1
100 TABLET, FILM COATED ORAL DAILY PRN
Qty: 30 TABLET | Refills: 0 | Status: SHIPPED | OUTPATIENT
Start: 2024-12-17

## 2024-12-17 RX ORDER — DEXTROAMPHETAMINE SACCHARATE, AMPHETAMINE ASPARTATE, DEXTROAMPHETAMINE SULFATE AND AMPHETAMINE SULFATE 2.5; 2.5; 2.5; 2.5 MG/1; MG/1; MG/1; MG/1
1 TABLET ORAL DAILY
Qty: 30 TABLET | Refills: 0 | Status: SHIPPED | OUTPATIENT
Start: 2024-12-17 | End: 2024-12-23

## 2024-12-18 ENCOUNTER — TELEPHONE (OUTPATIENT)
Dept: PSYCHIATRY | Facility: CLINIC | Age: 44
End: 2024-12-18

## 2024-12-18 NOTE — TELEPHONE ENCOUNTER
NP referral to the [facility] received from [facility] for pt's alcohol and substance use. Called Bennett and left a VM regarding referral. MAT Office number provided. Will call back Bennett at a later date. Will refer pt to Dr. Edward.    For your review.

## 2024-12-19 ENCOUNTER — TRANSITIONAL CARE MANAGEMENT (OUTPATIENT)
Dept: FAMILY MEDICINE CLINIC | Facility: CLINIC | Age: 44
End: 2024-12-19

## 2024-12-23 ENCOUNTER — OFFICE VISIT (OUTPATIENT)
Dept: FAMILY MEDICINE CLINIC | Facility: CLINIC | Age: 44
End: 2024-12-23
Payer: COMMERCIAL

## 2024-12-23 VITALS
TEMPERATURE: 97.6 F | SYSTOLIC BLOOD PRESSURE: 168 MMHG | OXYGEN SATURATION: 99 % | HEIGHT: 70 IN | HEART RATE: 88 BPM | BODY MASS INDEX: 23.74 KG/M2 | WEIGHT: 165.8 LBS | DIASTOLIC BLOOD PRESSURE: 88 MMHG

## 2024-12-23 DIAGNOSIS — F11.90 OPIOID USE DISORDER: ICD-10-CM

## 2024-12-23 DIAGNOSIS — F90.0 ATTENTION DEFICIT HYPERACTIVITY DISORDER (ADHD), PREDOMINANTLY INATTENTIVE TYPE: ICD-10-CM

## 2024-12-23 DIAGNOSIS — F10.91 ALCOHOL USE DISORDER IN REMISSION: Primary | ICD-10-CM

## 2024-12-23 PROCEDURE — 99495 TRANSJ CARE MGMT MOD F2F 14D: CPT | Performed by: FAMILY MEDICINE

## 2024-12-23 RX ORDER — DEXTROAMPHETAMINE SACCHARATE, AMPHETAMINE ASPARTATE, DEXTROAMPHETAMINE SULFATE AND AMPHETAMINE SULFATE 3.75; 3.75; 3.75; 3.75 MG/1; MG/1; MG/1; MG/1
15 TABLET ORAL DAILY
Qty: 30 TABLET | Refills: 0 | Status: SHIPPED | OUTPATIENT
Start: 2024-12-23

## 2024-12-23 NOTE — PATIENT INSTRUCTIONS
Hospitalization reviewed.  Doing okay as far as use of alcohol.  Tapering off kratom.  Suggest when no longer taking kratom, to begin naltrexone to prevent alcohol cravings.  Discussion of ADHD.  Increased to 15 mg daily which he thinks cutting in half and using morning and late morning work best for him.  Continue with AA meetings.  Recheck in 1 month.
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
## 2024-12-24 ENCOUNTER — TELEPHONE (OUTPATIENT)
Age: 44
End: 2024-12-24

## 2024-12-24 NOTE — TELEPHONE ENCOUNTER
Dr. FLOWER, Did you want Polo to see Dr Edward?  I wanted you to see the message from [place] and I know you just saw the patient yesterday.

## 2024-12-24 NOTE — TELEPHONE ENCOUNTER
Naresh from [facility] office called regarding referral for drug & alcohol abuse received. He stated normally they would schedule pt with Dr. Edward. He stated he will reach out to pt once more to schedule. If unable to reach pt he will need to close referral. Is asking if we may able to reach pt as well to get him scheduled with Dr. Edward at [facility]. Please advise, thank you.

## 2025-01-21 DIAGNOSIS — F90.0 ATTENTION DEFICIT HYPERACTIVITY DISORDER (ADHD), PREDOMINANTLY INATTENTIVE TYPE: ICD-10-CM

## 2025-01-21 RX ORDER — DEXTROAMPHETAMINE SACCHARATE, AMPHETAMINE ASPARTATE, DEXTROAMPHETAMINE SULFATE AND AMPHETAMINE SULFATE 3.75; 3.75; 3.75; 3.75 MG/1; MG/1; MG/1; MG/1
15 TABLET ORAL DAILY
Qty: 30 TABLET | Refills: 0 | Status: SHIPPED | OUTPATIENT
Start: 2025-01-21

## 2025-02-24 DIAGNOSIS — F41.9 ANXIETY: ICD-10-CM

## 2025-02-24 DIAGNOSIS — F90.0 ATTENTION DEFICIT HYPERACTIVITY DISORDER (ADHD), PREDOMINANTLY INATTENTIVE TYPE: ICD-10-CM

## 2025-02-24 RX ORDER — DEXTROAMPHETAMINE SACCHARATE, AMPHETAMINE ASPARTATE, DEXTROAMPHETAMINE SULFATE AND AMPHETAMINE SULFATE 3.75; 3.75; 3.75; 3.75 MG/1; MG/1; MG/1; MG/1
15 TABLET ORAL DAILY
Qty: 30 TABLET | Refills: 0 | Status: SHIPPED | OUTPATIENT
Start: 2025-02-24

## 2025-02-25 RX ORDER — ESCITALOPRAM OXALATE 10 MG/1
10 TABLET ORAL DAILY
Qty: 90 TABLET | Refills: 1 | Status: SHIPPED | OUTPATIENT
Start: 2025-02-25

## 2025-02-27 ENCOUNTER — OFFICE VISIT (OUTPATIENT)
Dept: FAMILY MEDICINE CLINIC | Facility: CLINIC | Age: 45
End: 2025-02-27
Payer: COMMERCIAL

## 2025-02-27 VITALS
OXYGEN SATURATION: 100 % | HEART RATE: 73 BPM | TEMPERATURE: 97.5 F | DIASTOLIC BLOOD PRESSURE: 84 MMHG | HEIGHT: 70 IN | WEIGHT: 165.6 LBS | SYSTOLIC BLOOD PRESSURE: 142 MMHG | BODY MASS INDEX: 23.71 KG/M2

## 2025-02-27 DIAGNOSIS — F10.91 ALCOHOL USE DISORDER IN REMISSION: Primary | ICD-10-CM

## 2025-02-27 DIAGNOSIS — F90.0 ATTENTION DEFICIT HYPERACTIVITY DISORDER (ADHD), PREDOMINANTLY INATTENTIVE TYPE: ICD-10-CM

## 2025-02-27 DIAGNOSIS — F11.90 OPIOID USE: ICD-10-CM

## 2025-02-27 PROBLEM — F10.20 ALCOHOL USE DISORDER, SEVERE, DEPENDENCE (HCC): Status: RESOLVED | Noted: 2024-12-13 | Resolved: 2025-02-27

## 2025-02-27 PROBLEM — Z72.0 NICOTINE USE: Status: RESOLVED | Noted: 2024-12-13 | Resolved: 2025-02-27

## 2025-02-27 PROBLEM — F10.939 ALCOHOL WITHDRAWAL SYNDROME WITH COMPLICATION (HCC): Status: RESOLVED | Noted: 2024-12-13 | Resolved: 2025-02-27

## 2025-02-27 PROBLEM — F11.93 OPIOID WITHDRAWAL (HCC): Status: RESOLVED | Noted: 2024-12-13 | Resolved: 2025-02-27

## 2025-02-27 PROCEDURE — 99214 OFFICE O/P EST MOD 30 MIN: CPT | Performed by: FAMILY MEDICINE
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
## 2025-02-27 NOTE — PATIENT INSTRUCTIONS
Congratulated on continued sobriety.  Continue AA meetings.  Adderall working.  Usual dose.  Call for monthly refills.  Recheck in 4 months.

## 2025-03-27 DIAGNOSIS — F41.9 ANXIETY: ICD-10-CM

## 2025-03-27 DIAGNOSIS — F90.0 ATTENTION DEFICIT HYPERACTIVITY DISORDER (ADHD), PREDOMINANTLY INATTENTIVE TYPE: ICD-10-CM

## 2025-03-27 DIAGNOSIS — N52.8 OTHER MALE ERECTILE DYSFUNCTION: ICD-10-CM

## 2025-03-27 RX ORDER — ESCITALOPRAM OXALATE 10 MG/1
10 TABLET ORAL DAILY
Qty: 90 TABLET | Refills: 0 | Status: SHIPPED | OUTPATIENT
Start: 2025-03-27

## 2025-03-28 RX ORDER — SILDENAFIL 100 MG/1
100 TABLET, FILM COATED ORAL DAILY PRN
Qty: 30 TABLET | Refills: 5 | Status: SHIPPED | OUTPATIENT
Start: 2025-03-28

## 2025-03-28 RX ORDER — DEXTROAMPHETAMINE SACCHARATE, AMPHETAMINE ASPARTATE, DEXTROAMPHETAMINE SULFATE AND AMPHETAMINE SULFATE 3.75; 3.75; 3.75; 3.75 MG/1; MG/1; MG/1; MG/1
15 TABLET ORAL DAILY
Qty: 30 TABLET | Refills: 0 | Status: SHIPPED | OUTPATIENT
Start: 2025-03-28

## 2025-03-31 ENCOUNTER — APPOINTMENT (OUTPATIENT)
Dept: LAB | Facility: CLINIC | Age: 45
End: 2025-03-31
Payer: COMMERCIAL

## 2025-03-31 ENCOUNTER — OFFICE VISIT (OUTPATIENT)
Dept: FAMILY MEDICINE CLINIC | Facility: CLINIC | Age: 45
End: 2025-03-31
Payer: COMMERCIAL

## 2025-03-31 VITALS
HEIGHT: 70 IN | OXYGEN SATURATION: 98 % | SYSTOLIC BLOOD PRESSURE: 138 MMHG | TEMPERATURE: 97.7 F | DIASTOLIC BLOOD PRESSURE: 80 MMHG | WEIGHT: 170 LBS | HEART RATE: 76 BPM | BODY MASS INDEX: 24.34 KG/M2
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
## 2025-03-31 PROCEDURE — 86618 LYME DISEASE ANTIBODY: CPT

## 2025-03-31 PROCEDURE — 86617 LYME DISEASE ANTIBODY: CPT

## 2025-03-31 PROCEDURE — 99213 OFFICE O/P EST LOW 20 MIN: CPT | Performed by: FAMILY MEDICINE

## 2025-03-31 PROCEDURE — 36415 COLL VENOUS BLD VENIPUNCTURE: CPT

## 2025-03-31 NOTE — PATIENT INSTRUCTIONS
Too late for prophylaxis.  Obtain Lyme titer.  If positive, needs to be treated with doxycycline.  He is aware that the IgM antibody reflex and acute infection and IgG antibody reflects previous exposure.  Follow-up as needed.

## 2025-03-31 NOTE — PROGRESS NOTES
Name: Polo Nelson      : 1980      MRN: [id number]  Encounter Provider: Maximiliano Ness MD  Encounter Date: 3/31/2025   Encounter department: [facility]    Assessment & Plan  Tick bite, unspecified site, initial encounter    Orders:  •  Lyme Total AB W Reflex to IGM/IGG; Future       Patient Instructions   Too late for prophylaxis.  Obtain Lyme titer.  If positive, needs to be treated with doxycycline.  He is aware that the IgM antibody reflex and acute infection and IgG antibody reflects previous exposure.  Follow-up as needed.      History of Present Illness     HPI  Here with a concern of Lyme disease.  Had a tick bite.  Tick was engorged.  No rash.  However, complains of aches and fatigue.  Did have Lyme in the past.  Has some chills and sweats.  Tick was removed about 6 days ago so he is not a candidate for prophylaxis with doxycycline.    Review of Systems    Past Medical History:   Diagnosis Date   • Alcohol use disorder 2024    1st rehab.  Alcohol disorder started about mid 30s.  On and off until needed help.   • Anxiety 2024   • Attention deficit hyperactivity disorder (ADHD), predominantly inattentive type 10/29/2021   • Condyloma acuminata 2014   • Opioid use 2024    Use of kratom.  No other opioids.       History reviewed. No pertinent surgical history.  Social History     Social History Narrative     since .    One daughter.10- 3rd grade.    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     Enjoys cycling.    Rode bicycle with his wife and daughter from [place] to [place] in 2019.     [facility]      Current Outpatient Medications on File Prior to Visit   Medication Sig   • amphetamine-dextroamphetamine (ADDERALL, 15MG,) 15 MG tablet Take 1 tablet (15 mg total) by mouth daily Max Daily Amount: 15 mg   •  "escitalopram (LEXAPRO) 10 mg tablet Take 1 tablet (10 mg total) by mouth daily   • sildenafil (VIAGRA) 100 mg tablet Take 1 tablet (100 mg total) by mouth daily as needed for erectile dysfunction   • hydrOXYzine HCL (ATARAX) 25 mg tablet Take 1 tablet (25 mg total) by mouth every 6 (six) hours as needed for itching for up to 14 days   • traZODone (DESYREL) 50 mg tablet Take 1 tablet (50 mg total) by mouth daily at bedtime as needed for sleep     No Known Allergies  Immunization History   Administered Date(s) Administered   • COVID-19 MODERNA VACC 0.5 ML IM 11/29/2021   • COVID-19 PFIZER VACCINE 0.3 ML IM 04/03/2021, 04/25/2021   • Influenza, recombinant, quadrivalent,injectable, preservative free 10/29/2021   • Tdap 03/07/2022     Objective   /80 (BP Location: Left arm, Patient Position: Sitting, Cuff Size: Standard)   Pulse 76   Temp 97.7 °F (36.5 °C) (Temporal)   Ht 5' 10\" (1.778 m)   Wt 77.1 kg (170 lb)   SpO2 98%   BMI 24.39 kg/m²     Physical Exam  No rash present.  Lungs clear.  Heart regular.    "

## 2025-04-01 ENCOUNTER — RESULTS FOLLOW-UP (OUTPATIENT)
Dept: FAMILY MEDICINE CLINIC | Facility: CLINIC | Age: 45
End: 2025-04-01

## 2025-04-01 ENCOUNTER — TELEPHONE (OUTPATIENT)
Age: 45
End: 2025-04-01

## 2025-04-01 LAB
B BURGDOR IGG SERPL QL IA: POSITIVE
B BURGDOR IGG+IGM SER QL IA: POSITIVE
B BURGDOR IGM SERPL QL IA: NEGATIVE

## 2025-04-01 NOTE — RESULT ENCOUNTER NOTE
IgM is negative.  Positive IgG reflects previous infection.  No acute infection.  Does not need to be treated.

## 2025-04-01 NOTE — TELEPHONE ENCOUNTER
Patient called in regards to seeing test results came back positive and would like to know what will be the next steps.

## 2025-04-28 DIAGNOSIS — F90.0 ATTENTION DEFICIT HYPERACTIVITY DISORDER (ADHD), PREDOMINANTLY INATTENTIVE TYPE: ICD-10-CM

## 2025-04-28 RX ORDER — DEXTROAMPHETAMINE SACCHARATE, AMPHETAMINE ASPARTATE, DEXTROAMPHETAMINE SULFATE AND AMPHETAMINE SULFATE 3.75; 3.75; 3.75; 3.75 MG/1; MG/1; MG/1; MG/1
15 TABLET ORAL DAILY
Qty: 30 TABLET | Refills: 0 | Status: SHIPPED | OUTPATIENT
Start: 2025-04-28

## 2025-05-27 DIAGNOSIS — F90.0 ATTENTION DEFICIT HYPERACTIVITY DISORDER (ADHD), PREDOMINANTLY INATTENTIVE TYPE: ICD-10-CM

## 2025-05-27 RX ORDER — DEXTROAMPHETAMINE SACCHARATE, AMPHETAMINE ASPARTATE, DEXTROAMPHETAMINE SULFATE AND AMPHETAMINE SULFATE 3.75; 3.75; 3.75; 3.75 MG/1; MG/1; MG/1; MG/1
15 TABLET ORAL DAILY
Qty: 30 TABLET | Refills: 0 | Status: SHIPPED | OUTPATIENT
Start: 2025-05-27

## 2025-07-01 DIAGNOSIS — F90.0 ATTENTION DEFICIT HYPERACTIVITY DISORDER (ADHD), PREDOMINANTLY INATTENTIVE TYPE: ICD-10-CM

## 2025-07-01 DIAGNOSIS — F41.9 ANXIETY: ICD-10-CM

## 2025-07-01 RX ORDER — DEXTROAMPHETAMINE SACCHARATE, AMPHETAMINE ASPARTATE, DEXTROAMPHETAMINE SULFATE AND AMPHETAMINE SULFATE 3.75; 3.75; 3.75; 3.75 MG/1; MG/1; MG/1; MG/1
15 TABLET ORAL DAILY
Qty: 30 TABLET | Refills: 0 | Status: SHIPPED | OUTPATIENT
Start: 2025-07-01

## 2025-07-02 RX ORDER — ESCITALOPRAM OXALATE 10 MG/1
10 TABLET ORAL DAILY
Qty: 90 TABLET | Refills: 3 | Status: SHIPPED | OUTPATIENT
Start: 2025-07-02

## 2025-07-18 ENCOUNTER — PATIENT MESSAGE (OUTPATIENT)
Dept: FAMILY MEDICINE CLINIC | Facility: CLINIC | Age: 45
End: 2025-07-18

## 2025-07-18 DIAGNOSIS — F41.9 ANXIETY: ICD-10-CM

## 2025-07-18 DIAGNOSIS — F90.0 ATTENTION DEFICIT HYPERACTIVITY DISORDER (ADHD), PREDOMINANTLY INATTENTIVE TYPE: ICD-10-CM

## 2025-07-18 RX ORDER — ESCITALOPRAM OXALATE 10 MG/1
10 TABLET ORAL DAILY
Qty: 90 TABLET | Refills: 1 | Status: SHIPPED | OUTPATIENT
Start: 2025-07-18

## 2025-07-21 RX ORDER — DEXTROAMPHETAMINE SACCHARATE, AMPHETAMINE ASPARTATE, DEXTROAMPHETAMINE SULFATE AND AMPHETAMINE SULFATE 3.75; 3.75; 3.75; 3.75 MG/1; MG/1; MG/1; MG/1
15 TABLET ORAL DAILY
Qty: 30 TABLET | Refills: 0 | Status: SHIPPED | OUTPATIENT
Start: 2025-07-21 | End: 2025-07-22 | Stop reason: SDUPTHER

## 2025-07-22 DIAGNOSIS — F41.9 ANXIETY: ICD-10-CM

## 2025-07-22 DIAGNOSIS — F90.0 ATTENTION DEFICIT HYPERACTIVITY DISORDER (ADHD), PREDOMINANTLY INATTENTIVE TYPE: ICD-10-CM

## 2025-07-22 RX ORDER — DEXTROAMPHETAMINE SACCHARATE, AMPHETAMINE ASPARTATE, DEXTROAMPHETAMINE SULFATE AND AMPHETAMINE SULFATE 3.75; 3.75; 3.75; 3.75 MG/1; MG/1; MG/1; MG/1
15 TABLET ORAL DAILY
Qty: 30 TABLET | Refills: 0 | Status: SHIPPED | OUTPATIENT
Start: 2025-07-22

## 2025-07-22 NOTE — TELEPHONE ENCOUNTER
Patient called back because his previous call was disconnected. He was made aware that his request for an early refill of Adderall is pending approval from his provider and a note was added to the prescription. Patient verbalized understanding and said his pharmacy is closing in 1-1/2 hrs. Rerouting with high priority.    Patient also stated that he needs his escitalopram filled early and indicated that also may need a note on the prescription. Please confirm with the pharmacy if that's necessary.

## 2025-07-22 NOTE — TELEPHONE ENCOUNTER
Van called in regards to needing a new script sent in for patients Adderall due to medication being filled early. Pharmacy would like in Notes stating that medication is getting filled early this month due to patient going on vacation.

## 2025-07-22 NOTE — TELEPHONE ENCOUNTER
Please sign the order. I spoke to pharmacy and they just wanted a note within the order stating that the pt needed his medication early due to his vacation. I added the note I just need the order signed.